# Patient Record
Sex: MALE | Race: ASIAN | NOT HISPANIC OR LATINO
[De-identification: names, ages, dates, MRNs, and addresses within clinical notes are randomized per-mention and may not be internally consistent; named-entity substitution may affect disease eponyms.]

---

## 2018-10-14 ENCOUNTER — TRANSCRIPTION ENCOUNTER (OUTPATIENT)
Age: 43
End: 2018-10-14

## 2018-10-15 ENCOUNTER — OUTPATIENT (OUTPATIENT)
Dept: OUTPATIENT SERVICES | Facility: HOSPITAL | Age: 43
LOS: 1 days | Discharge: HOME | End: 2018-10-15

## 2018-10-15 VITALS
TEMPERATURE: 98 F | SYSTOLIC BLOOD PRESSURE: 101 MMHG | DIASTOLIC BLOOD PRESSURE: 69 MMHG | OXYGEN SATURATION: 98 % | HEART RATE: 62 BPM

## 2018-10-15 VITALS
WEIGHT: 171.08 LBS | HEIGHT: 66 IN | RESPIRATION RATE: 16 BRPM | OXYGEN SATURATION: 98 % | DIASTOLIC BLOOD PRESSURE: 80 MMHG | TEMPERATURE: 98 F | HEART RATE: 70 BPM | SYSTOLIC BLOOD PRESSURE: 112 MMHG

## 2018-10-15 DIAGNOSIS — M75.122 COMPLETE ROTATOR CUFF TEAR OR RUPTURE OF LEFT SHOULDER, NOT SPECIFIED AS TRAUMATIC: ICD-10-CM

## 2018-10-15 DIAGNOSIS — M75.100 UNSPECIFIED ROTATOR CUFF TEAR OR RUPTURE OF UNSPECIFIED SHOULDER, NOT SPECIFIED AS TRAUMATIC: ICD-10-CM

## 2018-10-15 DIAGNOSIS — Z98.890 OTHER SPECIFIED POSTPROCEDURAL STATES: Chronic | ICD-10-CM

## 2018-10-15 LAB
HCT VFR BLD CALC: 43.3 % — SIGNIFICANT CHANGE UP (ref 39–50)
HGB BLD-MCNC: 14.2 G/DL — SIGNIFICANT CHANGE UP (ref 13–17)
MCHC RBC-ENTMCNC: 29 PG — SIGNIFICANT CHANGE UP (ref 27–34)
MCHC RBC-ENTMCNC: 32.8 % — SIGNIFICANT CHANGE UP (ref 32–36)
MCV RBC AUTO: 88.5 FL — SIGNIFICANT CHANGE UP (ref 80–100)
NRBC # FLD: 0 — SIGNIFICANT CHANGE UP
PLATELET # BLD AUTO: 350 K/UL — SIGNIFICANT CHANGE UP (ref 150–400)
PMV BLD: 10.1 FL — SIGNIFICANT CHANGE UP (ref 7–13)
RBC # BLD: 4.89 M/UL — SIGNIFICANT CHANGE UP (ref 4.2–5.8)
RBC # FLD: 14.4 % — SIGNIFICANT CHANGE UP (ref 10.3–14.5)
WBC # BLD: 9.34 K/UL — SIGNIFICANT CHANGE UP (ref 3.8–10.5)
WBC # FLD AUTO: 9.34 K/UL — SIGNIFICANT CHANGE UP (ref 3.8–10.5)

## 2018-10-15 RX ORDER — ONDANSETRON 8 MG/1
1 TABLET, FILM COATED ORAL
Qty: 12 | Refills: 0 | OUTPATIENT
Start: 2018-10-15 | End: 2018-10-17

## 2018-10-15 NOTE — ASU DISCHARGE PLAN (ADULT/PEDIATRIC). - NOTIFY
Inability to Tolerate Liquids or Foods/Swelling that continues/Pain not relieved by Medications/Fever greater than 101/Bleeding that does not stop/Persistent Nausea and Vomiting/Unable to Urinate

## 2018-10-15 NOTE — H&P PST ADULT - NSANTHOSAYNRD_GEN_A_CORE
No. DAPHNEY screening performed.  STOP BANG Legend: 0-2 = LOW Risk; 3-4 = INTERMEDIATE Risk; 5-8 = HIGH Risk

## 2018-10-15 NOTE — ASU DISCHARGE PLAN (ADULT/PEDIATRIC). - NURSING INSTRUCTIONS
MUST wear sling @ ALL TIMES even when sleeping; may remove it for showering  DO NOT left lift arm; keep left arm @ side or in sling  Protect extremity until anesthesia block wears off.   DO NOT take any Tylenol (Acetaminophen) or narcotics containing Tylenol until after  _9pm tonight_____ . You received Tylenol during your operation and it can cause damage to your liver if too much is taken within a 24 hour time period.   apply ice 20minutes ON 20minutes OFF

## 2018-10-15 NOTE — H&P PST ADULT - FAMILY HISTORY
Father  Still living? Unknown  Hypertension, Age at diagnosis: Age Unknown  Diabetes mellitus, Age at diagnosis: Age Unknown     Mother  Still living? Unknown  Hypertension, Age at diagnosis: Age Unknown  Diabetes mellitus, Age at diagnosis: Age Unknown

## 2018-10-15 NOTE — H&P PST ADULT - HISTORY OF PRESENT ILLNESS
43 year old male with c/o left shoulder pain worsening over the past 3 months. Pt had MRI which revealed rotator cuff tear. Pt present today for presurgical evaluaiton for ... 43 year old male with c/o left shoulder pain worsening over the past 3 months. Pt had MRI which revealed rotator cuff tear. Pt present today for presurgical evaluation for Left Shoulder Arthroscopy, Rotator Cuff Repair scheduled today. Pt states he last ate at 10 pm yesterday and drank water this morning at 7:30 AM.

## 2018-10-15 NOTE — H&P PST ADULT - RESPIRATORY
Patient was called back and she states that she was seen by Dr Mca yesterday and was instructed to discontinue Xarelto and start aspirin 325 mg daily.  Patient would like to schedule appointment to have baker's cyst drained.  Order has been placed per Kingsley Stern PA-C.  Patient understands that she will get a call from scheduling.   detailed exam

## 2018-10-15 NOTE — ASU DISCHARGE PLAN (ADULT/PEDIATRIC). - MEDICATION SUMMARY - MEDICATIONS TO TAKE
I will START or STAY ON the medications listed below when I get home from the hospital:    Percocet 5/325 oral tablet  -- 1-2 tab(s) by mouth every 6 hours, As Needed -for moderate pain MDD:8 tabs  -- Caution federal law prohibits the transfer of this drug to any person other  than the person for whom it was prescribed.  May cause drowsiness.  Alcohol may intensify this effect.  Use care when operating dangerous machinery.  This prescription cannot be refilled.  This product contains acetaminophen.  Do not use  with any other product containing acetaminophen to prevent possible liver damage.  Using more of this medication than prescribed may cause serious breathing problems.    -- Indication: For pain    Zofran 4 mg oral tablet  -- 1 tab(s) by mouth every 6 hours, As Needed for nausea  -- Indication: For nausea

## 2020-01-27 ENCOUNTER — OUTPATIENT (OUTPATIENT)
Dept: OUTPATIENT SERVICES | Facility: HOSPITAL | Age: 45
LOS: 1 days | End: 2020-01-27

## 2020-01-27 VITALS
DIASTOLIC BLOOD PRESSURE: 82 MMHG | SYSTOLIC BLOOD PRESSURE: 138 MMHG | TEMPERATURE: 98 F | WEIGHT: 177.91 LBS | RESPIRATION RATE: 14 BRPM | HEART RATE: 88 BPM | HEIGHT: 66.25 IN | OXYGEN SATURATION: 96 %

## 2020-01-27 DIAGNOSIS — M75.121 COMPLETE ROTATOR CUFF TEAR OR RUPTURE OF RIGHT SHOULDER, NOT SPECIFIED AS TRAUMATIC: ICD-10-CM

## 2020-01-27 DIAGNOSIS — Z98.890 OTHER SPECIFIED POSTPROCEDURAL STATES: Chronic | ICD-10-CM

## 2020-01-27 LAB
ANION GAP SERPL CALC-SCNC: 15 MMO/L — HIGH (ref 7–14)
BUN SERPL-MCNC: 12 MG/DL — SIGNIFICANT CHANGE UP (ref 7–23)
CALCIUM SERPL-MCNC: 9.5 MG/DL — SIGNIFICANT CHANGE UP (ref 8.4–10.5)
CHLORIDE SERPL-SCNC: 95 MMOL/L — LOW (ref 98–107)
CO2 SERPL-SCNC: 27 MMOL/L — SIGNIFICANT CHANGE UP (ref 22–31)
CREAT SERPL-MCNC: 0.87 MG/DL — SIGNIFICANT CHANGE UP (ref 0.5–1.3)
GLUCOSE SERPL-MCNC: 79 MG/DL — SIGNIFICANT CHANGE UP (ref 70–99)
HCT VFR BLD CALC: 47.9 % — SIGNIFICANT CHANGE UP (ref 39–50)
HGB BLD-MCNC: 15.7 G/DL — SIGNIFICANT CHANGE UP (ref 13–17)
MCHC RBC-ENTMCNC: 29.5 PG — SIGNIFICANT CHANGE UP (ref 27–34)
MCHC RBC-ENTMCNC: 32.8 % — SIGNIFICANT CHANGE UP (ref 32–36)
MCV RBC AUTO: 90 FL — SIGNIFICANT CHANGE UP (ref 80–100)
NRBC # FLD: 0 K/UL — SIGNIFICANT CHANGE UP (ref 0–0)
PLATELET # BLD AUTO: 367 K/UL — SIGNIFICANT CHANGE UP (ref 150–400)
PMV BLD: 10.4 FL — SIGNIFICANT CHANGE UP (ref 7–13)
POTASSIUM SERPL-MCNC: 4 MMOL/L — SIGNIFICANT CHANGE UP (ref 3.5–5.3)
POTASSIUM SERPL-SCNC: 4 MMOL/L — SIGNIFICANT CHANGE UP (ref 3.5–5.3)
RBC # BLD: 5.32 M/UL — SIGNIFICANT CHANGE UP (ref 4.2–5.8)
RBC # FLD: 14 % — SIGNIFICANT CHANGE UP (ref 10.3–14.5)
SODIUM SERPL-SCNC: 137 MMOL/L — SIGNIFICANT CHANGE UP (ref 135–145)
WBC # BLD: 10.84 K/UL — HIGH (ref 3.8–10.5)
WBC # FLD AUTO: 10.84 K/UL — HIGH (ref 3.8–10.5)

## 2020-01-27 RX ORDER — IBUPROFEN 200 MG
1 TABLET ORAL
Qty: 0 | Refills: 0 | DISCHARGE

## 2020-01-27 NOTE — H&P PST ADULT - ASSESSMENT
DX: complete rotator cuff tear or rupture of right shoulder, not specified as traumatic and evaluated for a scheduled right shoulder arthroscopy rotator cuff repair on 2/3/2020.    Plan: DX: complete rotator cuff tear or rupture of right shoulder, not specified as traumatic and evaluated for a scheduled right shoulder arthroscopy rotator cuff repair on 2/3/2020.    Plan:   Preop instructions provided including NPO status, Pepcid and Hibiclens Verbalized understanding. BW sent and awaiting results. MC pending requested due to severe sinus tenderness/ congestion noted and pt stated S&S to be present for  1 wk. Form provided, PST to f/u. DX: complete rotator cuff tear or rupture of right shoulder, not specified as traumatic and evaluated for a scheduled right shoulder arthroscopy rotator cuff repair on 2/3/2020.    Plan:   Preop instructions provided including NPO status, Pepcid and Hibiclens wash. Pt verbalized understanding. BW sent and awaiting results. MC pending requested due to severe sinus tenderness/ congestion noted and pt stated S&S to be present for  1 wk. Form provided, PST to f/u. Pt meets DAPHNEY precautions criteria OR booking notified via fax.

## 2020-01-27 NOTE — H&P PST ADULT - NSANTHOSAYNRD_GEN_A_CORE
No. DAPHNEY screening performed.  STOP BANG Legend: 0-2 = LOW Risk; 3-4 = INTERMEDIATE Risk; 5-8 = HIGH Risk/never tested

## 2020-01-27 NOTE — H&P PST ADULT - MUSCULOSKELETAL
details… detailed exam decreased ROM due to pain/RUE/no calf tenderness/joint swelling/diminished strength

## 2020-01-27 NOTE — H&P PST ADULT - HISTORY OF PRESENT ILLNESS
43 y/o male presents to PST w/ a preop dx of complete rotator cuff tear or rupture of right shoulder, not specified as traumatic and to be evaluated for a scheduled right shoulder arthroscopy rotator cuff repair on 2/3/2020. Pt states was pushing heavy boxes last december 2019 and injured rt shoulder. Pt went to see his pcp and MRI ordered, pt dx w/ a rotator cuff tear and referred to Dr Pride who evaluated him (did sx on left shoulder on 2018) and recommended sx at this time.

## 2020-01-27 NOTE — H&P PST ADULT - NEGATIVE ALLERGY TYPES
no reactions to insect bites/no indoor environmental allergies/no reactions to food/no reactions to animals/no outdoor environmental allergies

## 2020-01-27 NOTE — H&P PST ADULT - ENT GEN HX ROS MEA POS PC
nasal congestion/post-nasal discharge/cold resolving w/meds otc/sinus symptoms/nasal discharge/nasal obstruction nasal discharge/post-nasal discharge/sinus symptoms/nasal congestion/cold / sinus symptoms x 1 wk stated, on meds otc w/o relief/nasal obstruction

## 2020-01-27 NOTE — H&P PST ADULT - NSICDXPASTMEDICALHX_GEN_ALL_CORE_FT
PAST MEDICAL HISTORY:  Asthma denies intubations or hospitalizations    No pertinent past medical history

## 2020-01-27 NOTE — H&P PST ADULT - NEGATIVE CARDIOVASCULAR SYMPTOMS
no orthopnea/no chest pain/no palpitations/no paroxysmal nocturnal dyspnea/no claudication/no dyspnea on exertion/no peripheral edema

## 2020-01-27 NOTE — H&P PST ADULT - NEGATIVE ENMT SYMPTOMS
no recurrent cold sores/no hearing difficulty/no abnormal taste sensation/no dry mouth/no ear pain/no tinnitus/no nose bleeds/no vertigo/no gum bleeding/no throat pain/no dysphagia

## 2020-01-27 NOTE — H&P PST ADULT - RS GEN PE MLT RESP DETAILS PC
no chest wall tenderness/respirations non-labored/good air movement/breath sounds equal/no subcutaneous emphysema/clear to auscultation bilaterally/no rhonchi/no wheezes/no rales

## 2020-01-27 NOTE — H&P PST ADULT - NSICDXFAMILYHX_GEN_ALL_CORE_FT
FAMILY HISTORY:  Father  Still living? Unknown  Diabetes mellitus, Age at diagnosis: Age Unknown  Hypertension, Age at diagnosis: Age Unknown    Mother  Still living? Unknown  Diabetes mellitus, Age at diagnosis: Age Unknown  Hypertension, Age at diagnosis: Age Unknown

## 2020-01-27 NOTE — H&P PST ADULT - NS SC CAGE ALCOHOL CUT DOWN
Detail Level: Detailed Add 64746 Cpt? (Important Note: In 2017 The Use Of 45772 Is Being Tracked By Cms To Determine Future Global Period Reimbursement For Global Periods): no no

## 2020-02-02 ENCOUNTER — TRANSCRIPTION ENCOUNTER (OUTPATIENT)
Age: 45
End: 2020-02-02

## 2020-02-03 ENCOUNTER — OUTPATIENT (OUTPATIENT)
Dept: OUTPATIENT SERVICES | Facility: HOSPITAL | Age: 45
LOS: 1 days | Discharge: HOME | End: 2020-02-03

## 2020-02-03 VITALS
OXYGEN SATURATION: 99 % | HEART RATE: 76 BPM | SYSTOLIC BLOOD PRESSURE: 121 MMHG | DIASTOLIC BLOOD PRESSURE: 67 MMHG | TEMPERATURE: 98 F | RESPIRATION RATE: 18 BRPM

## 2020-02-03 VITALS
TEMPERATURE: 98 F | SYSTOLIC BLOOD PRESSURE: 134 MMHG | RESPIRATION RATE: 18 BRPM | HEIGHT: 66.25 IN | OXYGEN SATURATION: 99 % | DIASTOLIC BLOOD PRESSURE: 92 MMHG | WEIGHT: 177.91 LBS | HEART RATE: 78 BPM

## 2020-02-03 DIAGNOSIS — Z98.890 OTHER SPECIFIED POSTPROCEDURAL STATES: Chronic | ICD-10-CM

## 2020-02-03 DIAGNOSIS — M75.121 COMPLETE ROTATOR CUFF TEAR OR RUPTURE OF RIGHT SHOULDER, NOT SPECIFIED AS TRAUMATIC: ICD-10-CM

## 2020-02-03 RX ORDER — ONDANSETRON 8 MG/1
1 TABLET, FILM COATED ORAL
Qty: 12 | Refills: 0
Start: 2020-02-03 | End: 2020-02-05

## 2020-02-03 NOTE — ASU DISCHARGE PLAN (ADULT/PEDIATRIC) - CALL YOUR DOCTOR IF YOU HAVE ANY OF THE FOLLOWING:
Numbness, tingling, color or temperature change to extremity/Wound/Surgical Site with redness, or foul smelling discharge or pus/Bleeding that does not stop/Fever greater than (need to indicate Fahrenheit or Celsius)/Swelling that gets worse/Pain not relieved by Medications Wound/Surgical Site with redness, or foul smelling discharge or pus/Pain not relieved by Medications/Bleeding that does not stop/Fever greater than (need to indicate Fahrenheit or Celsius)/Nausea and vomiting that does not stop/Numbness, tingling, color or temperature change to extremity/Unable to urinate/Inability to tolerate liquids or foods/Swelling that gets worse

## 2020-02-03 NOTE — ASU DISCHARGE PLAN (ADULT/PEDIATRIC) - CARE PROVIDER_API CALL
Jesús Pride)  Orthopaedic Surgery; Surgery of the Hand  [address], [address]  [place] [address]  Phone: [phone]  Fax: [phone]  Follow Up Time:

## 2020-02-03 NOTE — ASU DISCHARGE PLAN (ADULT/PEDIATRIC) - PAIN MANAGEMENT
Prescriptions electronically submitted to pharmacy from [facility] Prescriptions electronically submitted to pharmacy from [facility]/Do not mix Percocet with Tylenol (acetaminophen) as it already contains Tylenol.

## 2020-05-05 NOTE — H&P PST ADULT - GENITOURINARY
Anesthesia Post Evaluation    Patient: Daphnie Vazquez    Procedure(s) Performed: Procedure(s) (LRB):  Ablation, SVT, Accessory Pathway (N/A)    Final Anesthesia Type: general    Patient location during evaluation: PACU  Patient participation: Yes- Able to Participate  Level of consciousness: awake and alert and oriented  Post-procedure vital signs: reviewed and stable  Pain management: adequate  Airway patency: patent    PONV status at discharge: No PONV  Anesthetic complications: no      Cardiovascular status: blood pressure returned to baseline, hemodynamically stable and stable  Respiratory status: unassisted, room air and spontaneous ventilation  Hydration status: euvolemic  Follow-up not needed.          Vitals Value Taken Time   /80 5/5/2020 10:32 AM   Temp 36.7 °C (98 °F) 5/5/2020 10:45 AM   Pulse 53 5/5/2020 11:00 AM   Resp 16 5/5/2020 11:00 AM   SpO2 100 % 5/5/2020 11:00 AM         No case tracking events are documented in the log.      Pain/Magdalena Score: Magdalena Score: 10 (5/5/2020 11:00 AM)        
details…

## 2020-09-23 NOTE — ASU PREOPERATIVE ASSESSMENT, ADULT ([facility] ONLY) - FALLEN IN THE PAST
Medicare Wellness Visit  Plan for Preventive Care    A good way for you to stay healthy is to use preventive care.  Medicare covers many services that can help you stay healthy.* The goal of these services is to find any health problems as quickly as possible. Finding problems early can help make them easier to treat.  Your personal plan below lists the services you may need and when they are due.     Health Maintenance Summary     Shingles Vaccine (1 of 2)  Overdue since 4/8/2006    Influenza Vaccine (1)  Overdue since 9/1/2020    Breast Cancer Screening (Every 2 Years)  Ordered on 6/23/2020    DM/CKD GFR (Yearly)  Order placed this encounter    DM/CKD Microalbumin (Yearly)  Next due on 6/23/2021    Medicare Wellness Visit (Yearly)  Next due on 9/23/2021    Colorectal Cancer Screening-Colonoscopy (Every 10 Years)  Next due on 1/6/2024    DTaP/Tdap/Td Vaccine (2 - Td)  Next due on 6/23/2026    Pneumococcal Vaccine 0-64   Aged Out    Hepatitis C Screening   Completed    Hepatitis B Vaccine   Aged Out    Meningococcal Vaccine   Aged Out    HPV Vaccine   Aged Out           Preventive Care for Women and Men    Heart Screenings (Cardiovascular):  · Blood tests are used to check your cholesterol, lipid and triglyceride levels. High levels can increase your risk for heart disease and stroke. High levels can be treated with medications, diet and exercise. Lowering your levels can help keep your heart and blood vessels healthy.  Your provider will order these tests if they are needed.    · An ultrasound is done to see if you have an abdominal aortic aneurysm (AAA).  This is an enlargement of one of the main blood vessels that delivers blood to the body.   In the [place], 9,000 deaths are caused by AAA.  You may not even know you have this problem and as many as 1 in 3 people will have a serious problem if it is not treated.  Early diagnosis allows for more effective treatment and cure.  If you have a family history  of AAA or are a male age 65-75 who has smoked, you are at higher risk of an AAA.  Your provider can order this test, if needed.    Colorectal Screening:  · There are many tests that are used to check for cancer of your colon and rectum. You and your provider should discuss what test is best for you and when to have it done.  Options include:  · Screening Colonoscopy: exam of the entire colon, seen through a flexible lighted tube.  · Flexible Sigmoidoscopy: exam of the last third (sigmoid portion) of the colon and rectum, seen through a flexible lighted tube.  · Cologuard DNA stool test: a sample of your stool is used to screen for cancer and unseen blood in your stool.  · Fecal Occult Blood Test: a sample of your stool is studied to find any unseen blood    Flu Shot:  · An immunization that helps to prevent influenza (the flu). You should get this every year. The best time to get the shot is in the fall.    Pneumococcal Shot:  • Vaccines are available that can help prevent pneumococcal disease, which is any type of infection caused by Streptococcus pneumoniae bacteria.   Their use can prevent some cases of pneumonia, meningitis, and sepsis. There are two types of pneumococcal vaccines:   o Conjugate vaccines (PCV-13 or Prevnar 13®) - helps protect against the 13 types of pneumococcal bacteria that are the most common causes of serious infections in children and adults.    o Polysaccharide vaccine (PPSV23 or Uzprrctrh96®) - helps protect against 23 types of pneumococcal bacteria for patients who are recommended to get it.  These vaccines should be given at least 12 months apart.  A booster is usually not needed.     Hepatitis B Shot:  · An immunization that helps to protect people from getting Hepatitis B. Hepatitis B is a virus that spreads through contact with infected blood or body fluids. Many people with the virus do not have symptoms.  The virus can lead to serious problems, such as liver disease. Some people  are at higher risk than others. Your doctor will tell you if you need this shot.     Diabetes Screening:  · A test to measure sugar (glucose) in your blood is called a fasting blood sugar. Fasting means you cannot have food or drink for at least 8 hours before the test. This test can detect diabetes long before you may notice symptoms.    Glaucoma Screening:  · Glaucoma screening is performed by your eye doctor. The test measures the fluid pressure inside your eyes to determine if you have glaucoma.     Hepatitis C Screening:  · A blood test to see if you have the hepatitis C virus.  Hepatitis C attacks the liver and is a major cause of chronic liver disease.  Medicare will cover a single screening for all adults born between 1945 & 1965, or high risk patients (people who have injected illegal drugs or people who have had blood transfusions).  High risk patients who continue to inject illegal drugs can be screened for Hepatitis C every year.    Smoking and Tobacco-Use Cessation Counseling:  · Tobacco is the single greatest cause of disease and early death in our country today. Medication and counseling together can increase a person’s chance of quitting for good.   · Medicare covers two quitting attempts per year, with four counseling sessions per attempt (eight sessions in a 12 month period)    Preventive Screening tests for Women    Screening Mammograms and Breast Exams:  · An x-ray of your breasts to check for breast cancer before you or your doctor may be able to feel it.  If breast cancer is found early it can usually be treated with success.    Pelvic Exams and Pap Tests:  · An exam to check for cervical and vaginal cancer. A Pap test is a lab test in which cells are taken from your cervix and sent to the lab to look for signs of cervical cancer. If cancer of the cervix is found early, chances for a cure are good. Testing can generally end at age 65, or if a woman has a hysterectomy for a benign condition.  Your provider may recommend more frequent testing if certain abnormal results are found.    Bone Mass Measurements:  · A painless x-ray of your bone density to see if you are at risk for a broken bone. Bone density refers to the thickness of bones or how tightly the bone tissue is packed.    Preventive Screening tests for Men    Prostate Screening:  · Should you have a prostate cancer test (PSA)?  It is up to you to decide if you want a prostate cancer test. Talk to your clinician to find out if the test is right for you.  Things for you to consider and talk about should include:  · Benefits and harms of the test  · Your family history  · How your race/ethnicity may influence the test  · If the test may impact other medical conditions you have  · Your values on screenings and treatments    *Medicare pays for many preventive services to keep you healthy. For some of these services, you might have to pay a deductible, coinsurance, and / or copayment.  The amounts vary depending on the type of services you need and the kind of Medicare health plan you have.               no

## 2020-10-29 NOTE — ASU PREOP CHECKLIST - SITE MARKED BY SURGEON
yes LEFT/yes Xeljanicez Pregnancy And Lactation Text: This medication is Pregnancy Category D and is not considered safe during pregnancy.  The risk during breast feeding is also uncertain.

## 2020-11-29 NOTE — H&P PST ADULT - BACK EXAM
[facility]  Anesthesia Pre-Operative Evaluation         Patient Name: Doris Thompson  YOB: 2009  MRN: [id number]    SUBJECTIVE:     Pre-operative evaluation for Procedure(s) (LRB):  CHOLECYSTECTOMY, LAPAROSCOPIC (N/A)     11/29/2020    Doris Thompson is a 11 y.o. female w/ a significant PMHx of gallstones currently with acute biliary pancreatitis.    Patient now presents for the above procedure(s).      LDA:        Peripheral IV - Single Lumen 11/28/20 0405 20 G Right Antecubital (Active)   Site Assessment Clean;Dry;Intact;No redness;No swelling 11/29/20 0611   Line Status Infusing 11/29/20 0611   Dressing Status Clean;Dry;Intact 11/29/20 0611   Dressing Intervention Integrity maintained 11/29/20 0611   Reason Not Rotated Not due 11/28/20 1920   Number of days: 1        Prev airway: None documented.    Drips: None documented.   dextrose 5 % and 0.9 % NaCl with KCl 20 mEq 1,000 mL (11/29/20 0914)        Patient Active Problem List   Diagnosis    Acute biliary pancreatitis without infection or necrosis    Gallstones       Review of patient's allergies indicates:  No Known Allergies    Current Outpatient Medications:    Current Facility-Administered Medications:     acetaminophen tablet 500 mg, 500 mg, Oral, Q6H PRN, Sonya Adame MD, 500 mg at 11/29/20 0913    dextrose 5 % and 0.9 % NaCl with KCl 20 mEq infusion, 1,000 mL, Intravenous, Continuous, Bernadine Sumner MD, Last Rate: 120 mL/hr at 11/29/20 0914, 1,000 mL at 11/29/20 0914    ketorolac injection 15 mg, 15 mg, Intravenous, Q6H PRN, Gumaro Law MD    morphine injection 2 mg, 2 mg, Intravenous, Q4H PRN, Bernadine Sumner MD    ondansetron injection 4 mg, 4 mg, Intravenous, Q6H PRN, Bernadine Sumner MD    No past surgical history on file.    Social History     Socioeconomic History    Marital status: Single     Spouse name: Not on file    Number of children: Not on file    Years of education: Not  on file    Highest education level: Not on file   Occupational History    Not on file   Social Needs    Financial resource strain: Not on file    Food insecurity     Worry: Not on file     Inability: Not on file    Transportation needs     Medical: Not on file     Non-medical: Not on file   Tobacco Use    Smoking status: Not on file   Substance and Sexual Activity    Alcohol use: Not on file    Drug use: Not on file    Sexual activity: Not on file   Lifestyle    Physical activity     Days per week: Not on file     Minutes per session: Not on file    Stress: Not on file   Relationships    Social connections     Talks on phone: Not on file     Gets together: Not on file     Attends Hinduism service: Not on file     Active member of club or organization: Not on file     Attends meetings of clubs or organizations: Not on file     Relationship status: Not on file   Other Topics Concern    Not on file   Social History Narrative    Not on file       OBJECTIVE:     Vital Signs Range (Last 24H):  Temp:  [37.1 °C (98.8 °F)-38.2 °C (100.8 °F)]   Pulse:  []   Resp:  [18-32]   BP: (102-113)/(51-59)   SpO2:  [92 %-100 %]       Significant Labs:  Lab Results   Component Value Date    WBC 15.58 (H) 11/29/2020    HGB 9.2 (L) 11/29/2020    HCT 29.5 (L) 11/29/2020     11/29/2020    CHOL 158 11/28/2020    TRIG 91 11/28/2020    HDL 42 11/28/2020    ALT 50 (H) 11/29/2020    AST 16 11/29/2020     11/29/2020    K 4.2 11/29/2020     11/29/2020    CREATININE 0.5 11/29/2020    BUN 7 11/29/2020    CO2 22 (L) 11/29/2020       Diagnostic Studies: No relevant studies.    EKG:   No results found for this or any previous visit.    2D ECHO:  TTE:  No results found for this or any previous visit.    RAYRAY:  No results found for this or any previous visit.    ASSESSMENT/PLAN:                                                                                                       11/29/2020  Doris Thompson is a 11  y.o., female.    Anesthesia Evaluation    I have reviewed the Patient Summary Reports.    I have reviewed the Nursing Notes. I have reviewed the NPO Status.   I have reviewed the Medications.     Review of Systems  Anesthesia Hx:  Neg history of prior surgery. Denies Family Hx of Anesthesia complications.    Hematology/Oncology:  Hematology Normal   Oncology Normal     EENT/Dental:EENT/Dental Normal   Cardiovascular:  Cardiovascular Normal     Pulmonary:  Pulmonary Normal    Hepatic/GI:  Hepatic/GI Normal    Musculoskeletal:  Musculoskeletal Normal    Neurological:  Neurology Normal    Endocrine:  Endocrine Normal    Dermatological:  Skin Normal    Psych:  Psychiatric Normal           Physical Exam  General:  Well nourished    Airway/Jaw/Neck:  Airway Findings: Mouth Opening: Normal Tongue: Normal  General Airway Assessment: Pediatric  Mallampati: II  TM Distance: 4 - 6 cm  Jaw/Neck Findings:  Neck ROM: Normal ROM      Dental:  Dental Findings: In tact   Chest/Lungs:  Chest/Lungs Findings: Clear to auscultation, Normal Respiratory Rate     Heart/Vascular:  Heart Findings: Rate: Normal  Rhythm: Regular Rhythm  Sounds: Normal     Abdomen:  Abdomen Findings: Normal    Musculoskeletal:  Musculoskeletal Findings: Normal   Skin:  Skin Findings: Normal    Mental Status:  Mental Status Findings:  Cooperative, Alert and Oriented         Anesthesia Plan  Type of Anesthesia, risks & benefits discussed:  Anesthesia Type:  general  Patient's Preference:   Intra-op Monitoring Plan: standard ASA monitors  Intra-op Monitoring Plan Comments:   Post Op Pain Control Plan: multimodal analgesia, IV/PO Opioids PRN and per primary service following discharge from PACU  Post Op Pain Control Plan Comments:   Induction:   IV  Beta Blocker:  Patient is not currently on a Beta-Blocker (No further documentation required).       Informed Consent: Patient understands risks and agrees with Anesthesia plan.  Questions answered. Anesthesia consent  signed with patient.  ASA Score: 1     Day of Surgery Review of History & Physical: I have interviewed and examined the patient. I have reviewed the patient's H&P dated:  There are no significant changes.  H&P update referred to the surgeon.         Ready For Surgery From Anesthesia Perspective.        normal shape/strength intact/ROM intact

## 2021-08-28 ENCOUNTER — EMERGENCY (EMERGENCY)
Facility: HOSPITAL | Age: 46
LOS: 0 days | Discharge: HOME | End: 2021-08-29
Attending: STUDENT IN AN ORGANIZED HEALTH CARE EDUCATION/TRAINING PROGRAM
Payer: COMMERCIAL

## 2021-08-28 VITALS
SYSTOLIC BLOOD PRESSURE: 117 MMHG | RESPIRATION RATE: 18 BRPM | DIASTOLIC BLOOD PRESSURE: 75 MMHG | OXYGEN SATURATION: 98 % | HEIGHT: 67 IN | HEART RATE: 60 BPM | TEMPERATURE: 98 F | WEIGHT: 175.05 LBS

## 2021-08-28 DIAGNOSIS — J45.909 UNSPECIFIED ASTHMA, UNCOMPLICATED: ICD-10-CM

## 2021-08-28 DIAGNOSIS — R07.9 CHEST PAIN, UNSPECIFIED: ICD-10-CM

## 2021-08-28 DIAGNOSIS — R20.2 PARESTHESIA OF SKIN: ICD-10-CM

## 2021-08-28 DIAGNOSIS — Z98.890 OTHER SPECIFIED POSTPROCEDURAL STATES: Chronic | ICD-10-CM

## 2021-08-28 DIAGNOSIS — Z20.822 CONTACT WITH AND (SUSPECTED) EXPOSURE TO COVID-19: ICD-10-CM

## 2021-08-28 DIAGNOSIS — Z88.1 ALLERGY STATUS TO OTHER ANTIBIOTIC AGENTS STATUS: ICD-10-CM

## 2021-08-28 DIAGNOSIS — F17.210 NICOTINE DEPENDENCE, CIGARETTES, UNCOMPLICATED: ICD-10-CM

## 2021-08-28 LAB
ALBUMIN SERPL ELPH-MCNC: 3.4 G/DL — SIGNIFICANT CHANGE UP (ref 3.3–5)
ALP SERPL-CCNC: 64 U/L — SIGNIFICANT CHANGE UP (ref 40–120)
ALT FLD-CCNC: 43 U/L — SIGNIFICANT CHANGE UP (ref 12–78)
ANION GAP SERPL CALC-SCNC: 3 MMOL/L — LOW (ref 5–17)
APTT BLD: 33 SEC — SIGNIFICANT CHANGE UP (ref 27.5–35.5)
AST SERPL-CCNC: 18 U/L — SIGNIFICANT CHANGE UP (ref 15–37)
BASOPHILS # BLD AUTO: 0.04 K/UL — SIGNIFICANT CHANGE UP (ref 0–0.2)
BASOPHILS NFR BLD AUTO: 0.5 % — SIGNIFICANT CHANGE UP (ref 0–2)
BILIRUB SERPL-MCNC: 0.3 MG/DL — SIGNIFICANT CHANGE UP (ref 0.2–1.2)
BUN SERPL-MCNC: 16 MG/DL — SIGNIFICANT CHANGE UP (ref 7–23)
CALCIUM SERPL-MCNC: 8.2 MG/DL — LOW (ref 8.5–10.1)
CHLORIDE SERPL-SCNC: 106 MMOL/L — SIGNIFICANT CHANGE UP (ref 96–108)
CK MB BLD-MCNC: 0.6 % — SIGNIFICANT CHANGE UP (ref 0–3.5)
CK MB BLD-MCNC: 0.7 % — SIGNIFICANT CHANGE UP (ref 0–3.5)
CK MB CFR SERPL CALC: 1.8 NG/ML — SIGNIFICANT CHANGE UP (ref 0.5–3.6)
CK MB CFR SERPL CALC: 2 NG/ML — SIGNIFICANT CHANGE UP (ref 0.5–3.6)
CK SERPL-CCNC: 270 U/L — SIGNIFICANT CHANGE UP (ref 26–308)
CK SERPL-CCNC: 324 U/L — HIGH (ref 26–308)
CO2 SERPL-SCNC: 31 MMOL/L — SIGNIFICANT CHANGE UP (ref 22–31)
CREAT SERPL-MCNC: 0.89 MG/DL — SIGNIFICANT CHANGE UP (ref 0.5–1.3)
EOSINOPHIL # BLD AUTO: 0.34 K/UL — SIGNIFICANT CHANGE UP (ref 0–0.5)
EOSINOPHIL NFR BLD AUTO: 3.9 % — SIGNIFICANT CHANGE UP (ref 0–6)
FLUAV AG NPH QL: SIGNIFICANT CHANGE UP
FLUBV AG NPH QL: SIGNIFICANT CHANGE UP
GLUCOSE SERPL-MCNC: 100 MG/DL — HIGH (ref 70–99)
HCT VFR BLD CALC: 45.3 % — SIGNIFICANT CHANGE UP (ref 39–50)
HGB BLD-MCNC: 14.9 G/DL — SIGNIFICANT CHANGE UP (ref 13–17)
IMM GRANULOCYTES NFR BLD AUTO: 0.1 % — SIGNIFICANT CHANGE UP (ref 0–1.5)
INR BLD: 0.98 RATIO — SIGNIFICANT CHANGE UP (ref 0.88–1.16)
LYMPHOCYTES # BLD AUTO: 2.7 K/UL — SIGNIFICANT CHANGE UP (ref 1–3.3)
LYMPHOCYTES # BLD AUTO: 31.3 % — SIGNIFICANT CHANGE UP (ref 13–44)
MCHC RBC-ENTMCNC: 29.7 PG — SIGNIFICANT CHANGE UP (ref 27–34)
MCHC RBC-ENTMCNC: 32.9 GM/DL — SIGNIFICANT CHANGE UP (ref 32–36)
MCV RBC AUTO: 90.2 FL — SIGNIFICANT CHANGE UP (ref 80–100)
MONOCYTES # BLD AUTO: 0.9 K/UL — SIGNIFICANT CHANGE UP (ref 0–0.9)
MONOCYTES NFR BLD AUTO: 10.4 % — SIGNIFICANT CHANGE UP (ref 2–14)
NEUTROPHILS # BLD AUTO: 4.64 K/UL — SIGNIFICANT CHANGE UP (ref 1.8–7.4)
NEUTROPHILS NFR BLD AUTO: 53.8 % — SIGNIFICANT CHANGE UP (ref 43–77)
NRBC # BLD: 0 /100 WBCS — SIGNIFICANT CHANGE UP (ref 0–0)
PLATELET # BLD AUTO: 318 K/UL — SIGNIFICANT CHANGE UP (ref 150–400)
POTASSIUM SERPL-MCNC: 3.8 MMOL/L — SIGNIFICANT CHANGE UP (ref 3.5–5.3)
POTASSIUM SERPL-SCNC: 3.8 MMOL/L — SIGNIFICANT CHANGE UP (ref 3.5–5.3)
PROT SERPL-MCNC: 7.4 GM/DL — SIGNIFICANT CHANGE UP (ref 6–8.3)
PROTHROM AB SERPL-ACNC: 11.4 SEC — SIGNIFICANT CHANGE UP (ref 10.6–13.6)
RBC # BLD: 5.02 M/UL — SIGNIFICANT CHANGE UP (ref 4.2–5.8)
RBC # FLD: 14.9 % — HIGH (ref 10.3–14.5)
SARS-COV-2 RNA SPEC QL NAA+PROBE: SIGNIFICANT CHANGE UP
SODIUM SERPL-SCNC: 140 MMOL/L — SIGNIFICANT CHANGE UP (ref 135–145)
TROPONIN I SERPL-MCNC: <.015 NG/ML — SIGNIFICANT CHANGE UP (ref 0.01–0.04)
TROPONIN I SERPL-MCNC: <.015 NG/ML — SIGNIFICANT CHANGE UP (ref 0.01–0.04)
WBC # BLD: 8.63 K/UL — SIGNIFICANT CHANGE UP (ref 3.8–10.5)
WBC # FLD AUTO: 8.63 K/UL — SIGNIFICANT CHANGE UP (ref 3.8–10.5)

## 2021-08-28 PROCEDURE — 93010 ELECTROCARDIOGRAM REPORT: CPT

## 2021-08-28 PROCEDURE — 71045 X-RAY EXAM CHEST 1 VIEW: CPT | Mod: 26

## 2021-08-28 PROCEDURE — 99219: CPT

## 2021-08-28 PROCEDURE — 99285 EMERGENCY DEPT VISIT HI MDM: CPT

## 2021-08-28 RX ORDER — ASPIRIN/CALCIUM CARB/MAGNESIUM 324 MG
81 TABLET ORAL DAILY
Refills: 0 | Status: DISCONTINUED | OUTPATIENT
Start: 2021-08-28 | End: 2021-08-29

## 2021-08-28 NOTE — ED ADULT TRIAGE NOTE - CHIEF COMPLAINT QUOTE
47 y/o male with no PMH. Presents to the ED with c/c of left chest pain that radiates down the left shoulder that came on today at 1100. pt denies any n/v/d/c

## 2021-08-28 NOTE — H&P ADULT - NSHPLABSRESULTS_GEN_ALL_CORE
T(C): 36.8 (08-28-21 @ 16:53), Max: 36.8 (08-28-21 @ 16:53)  HR: 60 (08-28-21 @ 16:53) (60 - 60)  BP: 117/75 (08-28-21 @ 16:53) (117/75 - 117/75)  RR: 18 (08-28-21 @ 16:53) (18 - 18)  SpO2: 98% (08-28-21 @ 16:53) (98% - 98%)                        14.9   8.63  )-----------( 318      ( 28 Aug 2021 17:34 )             45.3     08-28    140  |  106  |  16  ----------------------------<  100<H>  3.8   |  31  |  0.89    Ca    8.2<L>      28 Aug 2021 17:34    TPro  7.4  /  Alb  3.4  /  TBili  0.3  /  DBili  x   /  AST  18  /  ALT  43  /  AlkPhos  64  08-28    LIVER FUNCTIONS - ( 28 Aug 2021 17:34 )  Alb: 3.4 g/dL / Pro: 7.4 gm/dL / ALK PHOS: 64 U/L / ALT: 43 U/L / AST: 18 U/L / GGT: x           PT/INR - ( 28 Aug 2021 17:34 )   PT: 11.4 sec;   INR: 0.98 ratio         PTT - ( 28 Aug 2021 17:34 )  PTT:33.0 sec    NSR at 86 bpm, no acute ischemic changes    aspirin enteric coated 81 milliGRAM(s) Oral daily

## 2021-08-28 NOTE — H&P ADULT - NSHPPHYSICALEXAM_GEN_ALL_CORE
PHYSICAL EXAM:    Vital Signs Last 24 Hrs  T(C): 36.8 (28 Aug 2021 16:53), Max: 36.8 (28 Aug 2021 16:53)  T(F): 98.2 (28 Aug 2021 16:53), Max: 98.2 (28 Aug 2021 16:53)  HR: 60 (28 Aug 2021 16:53) (60 - 60)  BP: 117/75 (28 Aug 2021 16:53) (117/75 - 117/75)  BP(mean): --  RR: 18 (28 Aug 2021 16:53) (18 - 18)  SpO2: 98% (28 Aug 2021 16:53) (98% - 98%)    GENERAL: Pt lying in bed comfortably in NAD  HEENT:  Atraumatic, EOMI, PERRL  NECK: Supple  CHEST/LUNG: Clear to auscultation bilaterally  HEART: Regular rate and rhythm  ABDOMEN: Bowel sounds present; Soft, Nontender, Nondistended.    EXTREMITIES:  2+ Peripheral Pulses, brisk capillary refill. No edema  NEUROLOGICAL:  No deficits   MSK: FROM x 4 extremities   SKIN: No rashes or lesions

## 2021-08-28 NOTE — ED ADULT NURSE NOTE - ED STAT RN HANDOFF DETAILS 2
Report given to hold RN Amyl, pt resting in stretcher, axo4, 20g IV R AC saline locked, NSR on monitor. No outstanding labs or medications.

## 2021-08-28 NOTE — ED PROVIDER NOTE - CLINICAL SUMMARY MEDICAL DECISION MAKING FREE TEXT BOX
pt presented with chest pain radiating into his left arm, initial ekg and trop negative, pt admitted to the observation unit to rule out due to presenting symptoms

## 2021-08-28 NOTE — H&P ADULT - ASSESSMENT
47 y/o male w/ no sig PMHx, presents to the ED c/o chest pain. Pt being placed on observation for r/o ACS    1) Chest pain  - c/w tele monitoring  - trends trops  - daily ASA  - f/u lipid panel in am  -     47 y/o male w/ no sig PMHx, presents to the ED c/o chest pain. Pt being placed on observation for r/o ACS    1) Chest pain  - c/w tele monitoring  - trends trops  - daily ASA  - f/u lipid panel in am  - pt w/ low DANAE and HEART Score; will complete trop set and discharge w/ outpt Cardiology follow up if negative.

## 2021-08-28 NOTE — H&P ADULT - HISTORY OF PRESENT ILLNESS
47 y/o male w/ no sig PMHx, presents to the ED c/o chest pain. Pt reports around 10:30 this morning while driving he developed sudden onset of left sided chest pain radiating to left arm, hand tingling. Pt describes pain as pressure/tight, 5/10 at max, no associated symptoms, and it lasted a few hours thus presented to the ED. Pt reports pain resolved by the time he arrived in the ED. Pt reports he does smoke tobacco however not heavily, he also drink a shot or 2 of vodka nightly, denies drug use or family hx of CAD. 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  In ED vitals stable, EKG no acute ischemia, trop neg x 1.

## 2021-08-28 NOTE — H&P ADULT - NSICDXFAMILYHX_GEN_ALL_CORE_FT
FAMILY HISTORY:  Father  Still living? Unknown  Diabetes mellitus, Age at diagnosis: Age Unknown  Hypertension, Age at diagnosis: Age Unknown    Mother  Still living? Unknown  Diabetes mellitus, Age at diagnosis: Age Unknown  Hypertension, Age at diagnosis: Age Unknown    
You can access the FollowMyHealth Patient Portal offered by [facility] by registering at the following website: [email or web address]. By joining [employer]’s FollowMyHealth portal, you will also be able to view your health information using other applications (apps) compatible with our system.

## 2021-08-28 NOTE — ED PROVIDER NOTE - MDM ORDERS SUBMITTED SELECTION
PT is postictal from home were she had a witnessed seizure that was about 2-3 minutes in length. PT is confused and has HX of brain tumor surgery in OCT. of 2018. PT appears confortable and aware Labs/EKG

## 2021-08-28 NOTE — ED ADULT NURSE REASSESSMENT NOTE - NS ED NURSE REASSESS COMMENT FT1
Pt axo4, resting in stretcher, pt placed on monitor, NSR noted, denies chest pain at this time. 20g IV L AC saline locked.

## 2021-08-28 NOTE — ED PROVIDER NOTE - OBJECTIVE STATEMENT
46 year old male with no past medical history presents today c/o chest pain, pt states that he was driving between 10-11am when he reports feeling a pressure type pain rated 8/10 which radiated into his left shoulder and arm with numbness which was constant, now has resolved in the ER, pt denies sob, palpitations, calf pain, hormonal medications or recent travel

## 2021-08-28 NOTE — ED ADULT NURSE NOTE - OBJECTIVE STATEMENT
pt is a 46 year old male, AAX4, presents with chest tightness and left arm numbness that began this morning at 10am , pt denies sob, n/v/d, lower leg pain, and or swelling, pt denies PMH. NKDA.

## 2021-08-29 ENCOUNTER — TRANSCRIPTION ENCOUNTER (OUTPATIENT)
Age: 46
End: 2021-08-29

## 2021-08-29 VITALS
RESPIRATION RATE: 17 BRPM | TEMPERATURE: 98 F | SYSTOLIC BLOOD PRESSURE: 135 MMHG | HEART RATE: 72 BPM | DIASTOLIC BLOOD PRESSURE: 85 MMHG | OXYGEN SATURATION: 100 %

## 2021-08-29 LAB
CK MB BLD-MCNC: 0.6 % — SIGNIFICANT CHANGE UP (ref 0–3.5)
CK MB CFR SERPL CALC: 1.5 NG/ML — SIGNIFICANT CHANGE UP (ref 0.5–3.6)
CK SERPL-CCNC: 243 U/L — SIGNIFICANT CHANGE UP (ref 26–308)
TROPONIN I SERPL-MCNC: <.015 NG/ML — SIGNIFICANT CHANGE UP (ref 0.01–0.04)

## 2021-08-29 PROCEDURE — 99217: CPT

## 2021-08-29 RX ORDER — PSEUDOEPHEDRINE HCL 30 MG
1 TABLET ORAL
Qty: 0 | Refills: 0 | DISCHARGE

## 2021-08-29 RX ORDER — ALBUTEROL 90 UG/1
2 AEROSOL, METERED ORAL
Qty: 0 | Refills: 0 | DISCHARGE

## 2021-08-29 RX ORDER — ASPIRIN/CALCIUM CARB/MAGNESIUM 324 MG
1 TABLET ORAL
Qty: 0 | Refills: 0 | DISCHARGE
Start: 2021-08-29

## 2021-08-29 RX ORDER — DM/PSEUDOEPHED/ACETAMINOPHEN 15-30-325
10 CAPSULE ORAL
Qty: 0 | Refills: 0 | DISCHARGE

## 2021-08-29 NOTE — DISCHARGE NOTE PROVIDER - HOSPITAL COURSE
Patient returned to emergency department via stretcher accompanied by tech.          45 y/o male w/ no sig PMHx, presents to the ED c/o chest pain. Pt reports around 10:30 this morning while driving he developed sudden onset of left sided chest pain radiating to left arm, hand tingling. Pt describes pain as pressure/tight, 5/10 at max, no associated symptoms, and it lasted a few hours thus presented to the ED. Pt reports pain resolved by the time he arrived in the ED. Pt reports he does smoke tobacco however not heavily, he also drink a shot or 2 of vodka nightly, denies drug use or family hx of CAD. 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  In ED vitals stable, EKG no acute ischemia. Pt placed on telemetry observation, trops neg x 3, no events on tele, pt has been chest pain free. Pt w/ low DANAE and HEART score, will follow up with Cardiology as outpt for further evaluation

## 2021-08-29 NOTE — DISCHARGE NOTE NURSING/CASE MANAGEMENT/SOCIAL WORK - NSCORESITESY/N_GEN_A_CORE_RD
Sangita Spence transport here to [facility] patient to [facility]. No acute distress noted. Yes

## 2021-08-29 NOTE — DISCHARGE NOTE NURSING/CASE MANAGEMENT/SOCIAL WORK - PATIENT PORTAL LINK FT
You can access the FollowMyHealth Patient Portal offered by [facility] by registering at the following website: [email or web address]. By joining [employer]’s FollowMyHealth portal, you will also be able to view your health information using other applications (apps) compatible with our system.

## 2021-09-23 ENCOUNTER — TRANSCRIPTION ENCOUNTER (OUTPATIENT)
Age: 46
End: 2021-09-23

## 2022-11-17 NOTE — ASU PREOP CHECKLIST - NS PREOP CHK CHLOROHEX WASH
Level of Care: Med/Surg [1]   Admitting Physician: CARL SIMS [[id number]]   Attending Physician: CARL SIMS [[id number]]  
N/A

## 2024-02-01 ENCOUNTER — EMERGENCY (EMERGENCY)
Facility: HOSPITAL | Age: 49
LOS: 1 days | Discharge: HOME | End: 2024-02-01
Attending: EMERGENCY MEDICINE
Payer: COMMERCIAL

## 2024-02-01 VITALS
RESPIRATION RATE: 18 BRPM | WEIGHT: 179.9 LBS | HEART RATE: 75 BPM | OXYGEN SATURATION: 96 % | SYSTOLIC BLOOD PRESSURE: 156 MMHG | DIASTOLIC BLOOD PRESSURE: 91 MMHG | TEMPERATURE: 98 F | HEIGHT: 67 IN

## 2024-02-01 DIAGNOSIS — Z98.890 OTHER SPECIFIED POSTPROCEDURAL STATES: Chronic | ICD-10-CM

## 2024-02-01 LAB
A1C WITH ESTIMATED AVERAGE GLUCOSE RESULT: 5.8 % — HIGH (ref 4–5.6)
ALBUMIN SERPL ELPH-MCNC: 4.3 G/DL — SIGNIFICANT CHANGE UP (ref 3.3–5)
ALP SERPL-CCNC: 66 U/L — SIGNIFICANT CHANGE UP (ref 40–120)
ALT FLD-CCNC: 20 U/L — SIGNIFICANT CHANGE UP (ref 10–45)
ANION GAP SERPL CALC-SCNC: 8 MMOL/L — SIGNIFICANT CHANGE UP (ref 5–17)
APTT BLD: 34.5 SEC — SIGNIFICANT CHANGE UP (ref 24.5–35.6)
AST SERPL-CCNC: 20 U/L — SIGNIFICANT CHANGE UP (ref 10–40)
BASOPHILS # BLD AUTO: 0.03 K/UL — SIGNIFICANT CHANGE UP (ref 0–0.2)
BASOPHILS NFR BLD AUTO: 0.3 % — SIGNIFICANT CHANGE UP (ref 0–2)
BILIRUB SERPL-MCNC: 0.3 MG/DL — SIGNIFICANT CHANGE UP (ref 0.2–1.2)
BUN SERPL-MCNC: 17 MG/DL — SIGNIFICANT CHANGE UP (ref 7–23)
CALCIUM SERPL-MCNC: 9.4 MG/DL — SIGNIFICANT CHANGE UP (ref 8.4–10.5)
CHLORIDE SERPL-SCNC: 101 MMOL/L — SIGNIFICANT CHANGE UP (ref 96–108)
CHOLEST SERPL-MCNC: 173 MG/DL — SIGNIFICANT CHANGE UP
CO2 SERPL-SCNC: 30 MMOL/L — SIGNIFICANT CHANGE UP (ref 22–31)
CREAT SERPL-MCNC: 0.97 MG/DL — SIGNIFICANT CHANGE UP (ref 0.5–1.3)
EGFR: 96 ML/MIN/1.73M2 — SIGNIFICANT CHANGE UP
EOSINOPHIL # BLD AUTO: 0.28 K/UL — SIGNIFICANT CHANGE UP (ref 0–0.5)
EOSINOPHIL NFR BLD AUTO: 3.2 % — SIGNIFICANT CHANGE UP (ref 0–6)
ESTIMATED AVERAGE GLUCOSE: 120 MG/DL — HIGH (ref 68–114)
FLUAV AG NPH QL: SIGNIFICANT CHANGE UP
FLUBV AG NPH QL: SIGNIFICANT CHANGE UP
GLUCOSE SERPL-MCNC: 100 MG/DL — HIGH (ref 70–99)
HCT VFR BLD CALC: 50.5 % — HIGH (ref 39–50)
HDLC SERPL-MCNC: 49 MG/DL — SIGNIFICANT CHANGE UP
HGB BLD-MCNC: 16.2 G/DL — SIGNIFICANT CHANGE UP (ref 13–17)
IMM GRANULOCYTES NFR BLD AUTO: 0.3 % — SIGNIFICANT CHANGE UP (ref 0–0.9)
INR BLD: 1.04 RATIO — SIGNIFICANT CHANGE UP (ref 0.85–1.18)
LIPID PNL WITH DIRECT LDL SERPL: 107 MG/DL — HIGH
LYMPHOCYTES # BLD AUTO: 3.33 K/UL — HIGH (ref 1–3.3)
LYMPHOCYTES # BLD AUTO: 38.1 % — SIGNIFICANT CHANGE UP (ref 13–44)
MCHC RBC-ENTMCNC: 29.1 PG — SIGNIFICANT CHANGE UP (ref 27–34)
MCHC RBC-ENTMCNC: 32.1 GM/DL — SIGNIFICANT CHANGE UP (ref 32–36)
MCV RBC AUTO: 90.7 FL — SIGNIFICANT CHANGE UP (ref 80–100)
MONOCYTES # BLD AUTO: 0.92 K/UL — HIGH (ref 0–0.9)
MONOCYTES NFR BLD AUTO: 10.5 % — SIGNIFICANT CHANGE UP (ref 2–14)
NEUTROPHILS # BLD AUTO: 4.14 K/UL — SIGNIFICANT CHANGE UP (ref 1.8–7.4)
NEUTROPHILS NFR BLD AUTO: 47.6 % — SIGNIFICANT CHANGE UP (ref 43–77)
NON HDL CHOLESTEROL: 124 MG/DL — SIGNIFICANT CHANGE UP
NRBC # BLD: 0 /100 WBCS — SIGNIFICANT CHANGE UP (ref 0–0)
PLATELET # BLD AUTO: 390 K/UL — SIGNIFICANT CHANGE UP (ref 150–400)
POTASSIUM SERPL-MCNC: 4.6 MMOL/L — SIGNIFICANT CHANGE UP (ref 3.5–5.3)
POTASSIUM SERPL-SCNC: 4.6 MMOL/L — SIGNIFICANT CHANGE UP (ref 3.5–5.3)
PROT SERPL-MCNC: 8 G/DL — SIGNIFICANT CHANGE UP (ref 6–8.3)
PROTHROM AB SERPL-ACNC: 11.4 SEC — SIGNIFICANT CHANGE UP (ref 9.5–13)
RBC # BLD: 5.57 M/UL — SIGNIFICANT CHANGE UP (ref 4.2–5.8)
RBC # FLD: 14.6 % — HIGH (ref 10.3–14.5)
RSV RNA NPH QL NAA+NON-PROBE: SIGNIFICANT CHANGE UP
SARS-COV-2 RNA SPEC QL NAA+PROBE: SIGNIFICANT CHANGE UP
SODIUM SERPL-SCNC: 139 MMOL/L — SIGNIFICANT CHANGE UP (ref 135–145)
TRIGL SERPL-MCNC: 91 MG/DL — SIGNIFICANT CHANGE UP
TROPONIN T, HIGH SENSITIVITY RESULT: 6 NG/L — SIGNIFICANT CHANGE UP (ref 0–51)
WBC # BLD: 8.73 K/UL — SIGNIFICANT CHANGE UP (ref 3.8–10.5)
WBC # FLD AUTO: 8.73 K/UL — SIGNIFICANT CHANGE UP (ref 3.8–10.5)

## 2024-02-01 PROCEDURE — 96375 TX/PRO/DX INJ NEW DRUG ADDON: CPT | Mod: XU

## 2024-02-01 PROCEDURE — 85018 HEMOGLOBIN: CPT

## 2024-02-01 PROCEDURE — 93306 TTE W/DOPPLER COMPLETE: CPT

## 2024-02-01 PROCEDURE — 82435 ASSAY OF BLOOD CHLORIDE: CPT

## 2024-02-01 PROCEDURE — 99291 CRITICAL CARE FIRST HOUR: CPT

## 2024-02-01 PROCEDURE — 70544 MR ANGIOGRAPHY HEAD W/O DYE: CPT | Mod: 26,59

## 2024-02-01 PROCEDURE — 83605 ASSAY OF LACTIC ACID: CPT

## 2024-02-01 PROCEDURE — 71045 X-RAY EXAM CHEST 1 VIEW: CPT | Mod: 26

## 2024-02-01 PROCEDURE — 85014 HEMATOCRIT: CPT

## 2024-02-01 PROCEDURE — 96374 THER/PROPH/DIAG INJ IV PUSH: CPT | Mod: XU

## 2024-02-01 PROCEDURE — 83036 HEMOGLOBIN GLYCOSYLATED A1C: CPT

## 2024-02-01 PROCEDURE — 82947 ASSAY GLUCOSE BLOOD QUANT: CPT

## 2024-02-01 PROCEDURE — 70496 CT ANGIOGRAPHY HEAD: CPT | Mod: 26,MA

## 2024-02-01 PROCEDURE — 85025 COMPLETE CBC W/AUTO DIFF WBC: CPT

## 2024-02-01 PROCEDURE — 93306 TTE W/DOPPLER COMPLETE: CPT | Mod: 26

## 2024-02-01 PROCEDURE — 70553 MRI BRAIN STEM W/O & W/DYE: CPT | Mod: 26,MA

## 2024-02-01 PROCEDURE — 84132 ASSAY OF SERUM POTASSIUM: CPT

## 2024-02-01 PROCEDURE — 93356 MYOCRD STRAIN IMG SPCKL TRCK: CPT

## 2024-02-01 PROCEDURE — 70450 CT HEAD/BRAIN W/O DYE: CPT | Mod: MA

## 2024-02-01 PROCEDURE — G0378: CPT

## 2024-02-01 PROCEDURE — 80053 COMPREHEN METABOLIC PANEL: CPT

## 2024-02-01 PROCEDURE — 84484 ASSAY OF TROPONIN QUANT: CPT

## 2024-02-01 PROCEDURE — 82803 BLOOD GASES ANY COMBINATION: CPT

## 2024-02-01 PROCEDURE — 70549 MR ANGIOGRAPH NECK W/O&W/DYE: CPT | Mod: 26

## 2024-02-01 PROCEDURE — 84295 ASSAY OF SERUM SODIUM: CPT

## 2024-02-01 PROCEDURE — 85610 PROTHROMBIN TIME: CPT

## 2024-02-01 PROCEDURE — 0042T: CPT | Mod: MA

## 2024-02-01 PROCEDURE — 82330 ASSAY OF CALCIUM: CPT

## 2024-02-01 PROCEDURE — 70553 MRI BRAIN STEM W/O & W/DYE: CPT | Mod: MA

## 2024-02-01 PROCEDURE — 70498 CT ANGIOGRAPHY NECK: CPT | Mod: 26,MA

## 2024-02-01 PROCEDURE — 85730 THROMBOPLASTIN TIME PARTIAL: CPT

## 2024-02-01 PROCEDURE — 70498 CT ANGIOGRAPHY NECK: CPT | Mod: MA

## 2024-02-01 PROCEDURE — 70450 CT HEAD/BRAIN W/O DYE: CPT | Mod: 26,MA,59

## 2024-02-01 PROCEDURE — 36415 COLL VENOUS BLD VENIPUNCTURE: CPT

## 2024-02-01 PROCEDURE — 80061 LIPID PANEL: CPT

## 2024-02-01 PROCEDURE — 96376 TX/PRO/DX INJ SAME DRUG ADON: CPT | Mod: XU

## 2024-02-01 PROCEDURE — 70544 MR ANGIOGRAPHY HEAD W/O DYE: CPT

## 2024-02-01 PROCEDURE — A9585: CPT

## 2024-02-01 PROCEDURE — 99291 CRITICAL CARE FIRST HOUR: CPT | Mod: 25

## 2024-02-01 PROCEDURE — 87637 SARSCOV2&INF A&B&RSV AMP PRB: CPT

## 2024-02-01 PROCEDURE — 93005 ELECTROCARDIOGRAM TRACING: CPT

## 2024-02-01 PROCEDURE — 82962 GLUCOSE BLOOD TEST: CPT

## 2024-02-01 PROCEDURE — 70549 MR ANGIOGRAPH NECK W/O&W/DYE: CPT

## 2024-02-01 PROCEDURE — 70496 CT ANGIOGRAPHY HEAD: CPT | Mod: MA

## 2024-02-01 PROCEDURE — 71045 X-RAY EXAM CHEST 1 VIEW: CPT

## 2024-02-01 RX ORDER — CLOPIDOGREL BISULFATE 75 MG/1
75 TABLET, FILM COATED ORAL DAILY
Refills: 0 | Status: DISCONTINUED | OUTPATIENT
Start: 2024-02-01 | End: 2024-02-01

## 2024-02-01 RX ORDER — METOCLOPRAMIDE HCL 10 MG
10 TABLET ORAL ONCE
Refills: 0 | Status: COMPLETED | OUTPATIENT
Start: 2024-02-01 | End: 2024-02-01

## 2024-02-01 RX ORDER — ACETAMINOPHEN 500 MG
1000 TABLET ORAL ONCE
Refills: 0 | Status: COMPLETED | OUTPATIENT
Start: 2024-02-01 | End: 2024-02-01

## 2024-02-01 RX ORDER — ALPRAZOLAM 0.25 MG
0.5 TABLET ORAL ONCE
Refills: 0 | Status: DISCONTINUED | OUTPATIENT
Start: 2024-02-01 | End: 2024-02-01

## 2024-02-01 RX ORDER — ASPIRIN/CALCIUM CARB/MAGNESIUM 324 MG
81 TABLET ORAL DAILY
Refills: 0 | Status: DISCONTINUED | OUTPATIENT
Start: 2024-02-01 | End: 2024-02-01

## 2024-02-01 RX ORDER — ATORVASTATIN CALCIUM 80 MG/1
80 TABLET, FILM COATED ORAL AT BEDTIME
Refills: 0 | Status: DISCONTINUED | OUTPATIENT
Start: 2024-02-01 | End: 2024-02-01

## 2024-02-01 RX ORDER — DIPHENHYDRAMINE HCL 50 MG
25 CAPSULE ORAL ONCE
Refills: 0 | Status: COMPLETED | OUTPATIENT
Start: 2024-02-01 | End: 2024-02-01

## 2024-02-01 RX ADMIN — Medication 0.5 MILLIGRAM(S): at 16:53

## 2024-02-01 RX ADMIN — Medication 10 MILLIGRAM(S): at 22:34

## 2024-02-01 RX ADMIN — Medication 10 MILLIGRAM(S): at 12:48

## 2024-02-01 RX ADMIN — Medication 400 MILLIGRAM(S): at 20:01

## 2024-02-01 RX ADMIN — Medication 400 MILLIGRAM(S): at 13:10

## 2024-02-01 RX ADMIN — CLOPIDOGREL BISULFATE 75 MILLIGRAM(S): 75 TABLET, FILM COATED ORAL at 15:00

## 2024-02-01 RX ADMIN — Medication 81 MILLIGRAM(S): at 14:59

## 2024-02-01 RX ADMIN — ATORVASTATIN CALCIUM 80 MILLIGRAM(S): 80 TABLET, FILM COATED ORAL at 22:08

## 2024-02-01 RX ADMIN — Medication 25 MILLIGRAM(S): at 22:08

## 2024-02-01 NOTE — ED PROVIDER NOTE - CLINICAL SUMMARY MEDICAL DECISION MAKING FREE TEXT BOX
Patient is a 48 year-old-male with no known PMH presents with headache and L sided numbness/weakness. Code stroke activated at triage. NIH stroke scale of 1 due to decreased sensation in the LUE/LLE, but no drift appreciated. Rest of exam remarkable for 4+/5 strength in the LUE/LLE. Neurology at bedside. Will obtain labs and neuro imaging. Disposition pending workup and neurology evaluation. Not a TNK candidate at this time given out of window and low NIH stroke scale.

## 2024-02-01 NOTE — ED CDU PROVIDER DISPOSITION NOTE - PATIENT PORTAL LINK FT
You can access the FollowMyHealth Patient Portal offered by [facility] by registering at the following website: [email or web address]. By joining [employer]’s FollowMyHealth portal, you will also be able to view your health information using other applications (apps) compatible with our system.

## 2024-02-01 NOTE — ED CDU PROVIDER INITIAL DAY NOTE - ATTENDING APP SHARED VISIT CONTRIBUTION OF CARE
Attending MD Alcocer: I personally made/approved the management plan and take responsibility for the patient management.          *The above represents an initial assessment/impression. Please refer to progress notes for potential changes in patient clinical course*

## 2024-02-01 NOTE — ED PROVIDER NOTE - OTHER FINDINGS
ECG recorded at 1250 independently interpreted by me, Dr Francisco Alcocer, shows normal sinus rhythm incomplete right bundle branch block T wave inversions lead V1 to lead V3 no acute diagnostic ischemic ST-T changes no priors for comparison

## 2024-02-01 NOTE — ED ADULT NURSE NOTE - NSFALLUNIVINTERV_ED_ALL_ED
Bed/Stretcher in lowest position, wheels locked, appropriate side rails in place/Call bell, personal items and telephone in reach/Instruct patient to call for assistance before getting out of bed/chair/stretcher/Non-slip footwear applied when patient is off stretcher/[place] to call system/Physically safe environment - no spills, clutter or unnecessary equipment/Purposeful proactive rounding/Room/bathroom lighting operational, light cord in reach

## 2024-02-01 NOTE — CHART NOTE - NSCHARTNOTEFT_GEN_A_CORE
MRI Head/MRA Head/Neck performed:    BRAIN MRI:    Negative for recent infarct. Linear cortical signal at left cerebellum   corresponds to CT finding compatible with chronic infarct or other   cytotoxic injury. Much smaller focus seen on the right as well.    Supratentorial brain unremarkable.    No mass or pathologic enhancement.    BRAIN MRA:  Unremarkable.    NECK MRA:  Unremarkable.    -----------------------------------------    Case discussed with stroke fellow Dr. Juan Manuel Santos. Pt ok to f/u outpatient with neurology. From stroke perspective, does not need aspirin/plavix or statin at this time.

## 2024-02-01 NOTE — ED PROVIDER NOTE - OBJECTIVE STATEMENT
Patient is a 48 year-old-male with no known PMH presents with headache and L sided numbness/weakness. Code stroke activated at triage. Patient reports that he has been having headache since Saturday, progressively worsen, then around 8am this morning started having numbness/weakness in the L arm/leg. LKN last night but was normal this morning. Denies fever at home, chest pain, shortness of breath, abdominal pain.

## 2024-02-01 NOTE — CONSULT NOTE ADULT - SUBJECTIVE AND OBJECTIVE BOX
Neurology - Consult Note    -  Spectra: [phone] ([facility]), [phone] ([facility])  -    HPI: Patient DEISI SHIN is a 48y (1975) man with a PMHx significant for HTN presented as code stroke for headache, blurry vision, left arm numbness. Patient reports Saturday he had a posterior headache with blurry vision b/l. The next day started to feel his left arm was  numb 85% less sensation compared to right arm. He also had a 2 minute episode of vertigo yesterday. Initially was a 3/10 progressed to 10/10 within 1 day. Headache is nonpositional, Denies N/V, changes to speech, vision loss, weakness.       NIHSS 4  mRS 0  LKN 10 pm 1/31      Review of Systems:    All other review of systems is negative unless indicated above.    Allergies:  Zithromax (Other)      PMHx/PSHx/Family Hx: As above, otherwise see below   No pertinent past medical history    Asthma        Social Hx:  No current use of tobacco, alcohol, or illicit drugs      Medications:  MEDICATIONS  (STANDING):    MEDICATIONS  (PRN):      Vitals:  T(C): 36.4 (02-01-24 @ 12:16), Max: 36.4 (02-01-24 @ 12:16)  HR: 77 (02-01-24 @ 13:12) (75 - 77)  BP: 132/105 (02-01-24 @ 13:12) (132/105 - 156/91)  RR: 21 (02-01-24 @ 13:12) (18 - 21)  SpO2: 97% (02-01-24 @ 13:12) (96% - 97%)    Physical Examination:   General - NAD      Neurologic Exam:  Mental status - Awake, Alert, Oriented to person, place, and time. Speech fluent, repetition and naming intact. Follows simple and complex commands.     Cranial nerves - PERRL, VFF, EOMI, face sensation (V1-V3) intact b/l, left face slow to activation, hearing intact b/l, palate with symmetric elevation, trapezius 5/5 strength b/l, tongue midline on protrusion with full lateral movement    Motor - Normal bulk and tone throughout. No pronator drift.  Strength testing            Deltoid      Biceps      Triceps     Wrist Extension    Wrist Flexion         R            5                 5               5                     5                              5                        5                   L             5                 5               5                     5                              5                        5                               Hip Flexion    Hip Extension    Knee Flexion    Knee Extension    Dorsiflexion    Plantar Flexion  R              5                           5                       5                           5                            5                          5  L              5                           5                        5                           5                            5                          5    Sensation - Light touch decreased to left arm      Coordination - mild dysmetria to left arm . No tremors appreciated    Gait and station - Deferred due to fall/safety risk     Labs:                        16.2   8.73  )-----------( 390      ( 01 Feb 2024 12:31 )             50.5     02-01    139  |  101  |  17  ----------------------------<  100<H>  4.6   |  30  |  0.97    Ca    9.4      01 Feb 2024 12:31    TPro  8.0  /  Alb  4.3  /  TBili  0.3  /  DBili  x   /  AST  20  /  ALT  20  /  AlkPhos  66  02-01    CAPILLARY BLOOD GLUCOSE  112 (01 Feb 2024 12:34)      POCT Blood Glucose.: 112 mg/dL (01 Feb 2024 12:20)    LIVER FUNCTIONS - ( 01 Feb 2024 12:31 )  Alb: 4.3 g/dL / Pro: 8.0 g/dL / ALK PHOS: 66 U/L / ALT: 20 U/L / AST: 20 U/L / GGT: x             PT/INR - ( 01 Feb 2024 12:31 )   PT: 11.4 sec;   INR: 1.04 ratio         PTT - ( 01 Feb 2024 12:31 )  PTT:34.5 sec        Radiology:  < from: CT Angio Brain Stroke Protocol  w/ IV Cont (02.01.24 @ 12:46) >  CT PERFUSION: No perfusion deficit.    CTA NECK AND HEAD: No large vessel occlusion or major stenosis.    < from: CT Brain Stroke Protocol (02.01.24 @ 12:47) >  IMPRESSION: No acute intracranial hemorrhage, mass effect, or shift of   the midline structures.    Age indeterminate infarct within the left cerebellar hemisphere.      The bilateral intradural vertebral arteries, vertebrobasilar junction,   basilar artery, and basilar tip appear unremarkable as well as the   bilateral posterior cerebral arteries.    The imaged intracranial venous structures are patent.

## 2024-02-01 NOTE — ED CDU PROVIDER DISPOSITION NOTE - NSCDUDISPOSITION_ED_ALL_ED_DT
01-Feb-2024 15:18 35 yo man, hx of alcohol use disorder presents with possible ingestion of K2. Per EMS, mother was worried patient was pacing and forth and thinks he may have smoked K2. Patient denies K2 and cannabis use    Patient reports he does not know why he is here 02-Feb-2024 01:10

## 2024-02-01 NOTE — CONSULT NOTE ADULT - ASSESSMENT
48y (1975) man with a PMHx significant for HTN presented as code stroke for headache, blurry vision, left arm numbness. Patient reports Saturday he had a posterior headache with blurry vision b/l. The next day started to feel his left arm was  numb 85% less sensation compared to right arm. On exam, mild left dysmetria, numbness to left arm. CTP normal CTA No large vessel occlusion or major stenosis,  bilateral intradural vertebral arteries, vertebrobasilar junction, basilar artery, and basilar tip appear unremarkableimaged intracranial venous structures are patent.     Not Tenecteplase candidate due to out of windo  Not thrombectomy candidate due to no LVO    Impression: Left arm numbness with mild weakness and left nasolabial fold flattening due to right brain dysfunction possibly from acute ischemic stroke   CTH shows Age indeterminate infarct within the left cerebellar hemisphere    Recommendation:     Imaging/Labs  [] MRI brain w/o  [] MRA H/N, with T1 Fat Sat to rule out dissection   [] TTE with bubble study   [] HbA1C and Lipid Panel    Meds  [] Aspirin 81MG PO daily (ASA 300MG MD daily if patient fails dysphagia screen)  [] Plavix 75 mg daily for 3 weeks  [] Atorvastatin 80MG QHS, titrate to LDL<70  [] DVT prophylaxis - Lovenox 40MG SC daily    Other  [] Telemonitoring; Neurochecks and vital signs per unit protocol, Q4H  [] Permissive HTN up to 220/120 mmHg for first 24 hours after symptom onset followed by gradual normotension.   [] BG goal <180, avoid hypoglycemia  [] NPO until clears dysphagia screen, otherwise swallow evaluation  [] Fall, aspiration precautions  [] Stroke education provided     Case discussed with stroke fellow Dr. Santos undersupervision of stroke attending Dr. Yang.  48y (1975) man with a PMHx significant for HTN presented as code stroke for headache, blurry vision, left arm numbness. Patient reports Saturday he had a posterior headache with blurry vision b/l. The next day started to feel his left arm was  numb 85% less sensation compared to right arm. On exam, mild left dysmetria, numbness to left arm. CTP normal CTA No large vessel occlusion or major stenosis,  bilateral intradural vertebral arteries, vertebrobasilar junction, basilar artery, and basilar tip appear unremarkableimaged intracranial venous structures are patent.     Not Tenecteplase candidate due to out of windo  Not thrombectomy candidate due to no LVO    Impression: Left arm numbness with mild weakness and left nasolabial fold flattening due to right brain dysfunction possibly from acute ischemic stroke   CTH shows Age indeterminate infarct within the left cerebellar hemisphere, appears subacute     Recommendation:     Imaging/Labs  [] MRI brain w/o  [] MRA H/N, with T1 Fat Sat to rule out dissection   [] TTE with bubble study   [] HbA1C and Lipid Panel    Meds  [] Aspirin 81MG PO daily (ASA 300MG [place] daily if patient fails dysphagia screen)  [] Plavix 75 mg daily for 3 weeks  [] Atorvastatin 80MG QHS, titrate to LDL<70  [] DVT prophylaxis - Lovenox 40MG SC daily    Other  [] Telemonitoring; Neurochecks and vital signs per unit protocol, Q4H  [] Permissive HTN up to 220/120 mmHg for first 24 hours after symptom onset followed by gradual normotension.   [] BG goal <180, avoid hypoglycemia  [] NPO until clears dysphagia screen, otherwise swallow evaluation  [] Fall, aspiration precautions  [] Stroke education provided     Case discussed with stroke fellow Dr. Santos undersupervision of stroke attending Dr. Yang.  48y (1975) man with a PMHx significant for HTN presented as code stroke for headache, blurry vision, left arm numbness. Patient reports Saturday he had a posterior headache with blurry vision b/l. The next day started to feel his left arm was  numb 85% less sensation compared to right arm. On exam, mild left dysmetria, numbness to left arm. CTP normal CTA No large vessel occlusion or major stenosis,  bilateral intradural vertebral arteries, vertebrobasilar junction, basilar artery, and basilar tip appear unremarkableimaged intracranial venous structures are patent.     Not Tenecteplase candidate due to out of windo  Not thrombectomy candidate due to no LVO    Impression: Left arm numbness with mild weakness and left nasolabial fold flattening due to right brain dysfunction possibly from acute ischemic stroke   CTH shows Age indeterminate infarct within the left cerebellar hemisphere, appears subacute     Recommendation:     Imaging/Labs  [] MRI brain w/o  [] MRA H/N, with T1 Fat Sat to rule out dissection   [] TTE with bubble study   [] HbA1C and Lipid Panel    Meds  [] Aspirin 81MG PO daily (ASA 300MG [place] daily if patient fails dysphagia screen)  [] Plavix 75 mg daily for 3 weeks  [] Atorvastatin 80MG QHS, titrate to LDL<70  [] Headache treatment: give the following all together: tylenol 1g IV, Reglan 10MG IVP, Benadryl 25MG IVP (for extrapyramidal side effects of reglan) given Q8HR PRN for HA and repeat 2-3 times. If refractory to above, can trial solumedrol 125MG IV x1  [] DVT prophylaxis - Lovenox 40MG SC daily    Other  [] Telemonitoring; Neurochecks and vital signs per unit protocol, Q4H  [] Permissive HTN up to 220/120 mmHg for first 24 hours after symptom onset followed by gradual normotension.   [] BG goal <180, avoid hypoglycemia  [] NPO until clears dysphagia screen, otherwise swallow evaluation  [] Fall, aspiration precautions  [] Stroke education provided     Case discussed with stroke fellow Dr. Santos undersupervision of stroke attending Dr. Yang.

## 2024-02-01 NOTE — ED CDU PROVIDER DISPOSITION NOTE - NSFOLLOWUPINSTRUCTIONS_ED_ALL_ED_FT
Please make sure to follow up with your primary care doctor within 1-2 days and with the NEUROLOGY specialist. The information for follow up can be found below. Bring a copy of all of your results with you to your follow up appointments.   Return to the ER as discussed if you develop any new or worsening symptoms.    Please start taking:  ASPIRIN 81MG daily  PLAVIX 75MG daily  ATORVASTATIN 80mf daily    NEUROLOGY: Hydrate.     You can take Tylenol 1000mg and/or Motrin 600mg every 6 hours as needed for pain.     You may be contacted by our Emergency Department Referrals Coordinator to set up your follow up appointment within 24-48 hours of your discharge Monday- Friday: Pulmonology and Cardiology.    We recommend you follow up with your primary care provider within the next 2-3 days, please bring all of your results with you.     You can also follow up with NEUROLOGY Dr. Ruiz. Call the phone number listed below today to make a follow up appointment.   [address], [address]  [place] [address]  Phone: [phone]  Fax: [phone]    Please return to the Emergency Department with new, worsening, or concerning symptoms, such as:  -Severe, worsening headache  -Vision changes  -Uncontrollable vomiting  -Dizziness, syncope  -Pressure, pain, tightness in chest  -Facial drooping, arm weakness, or speech difficulty   -Head injury or loss of consciousness   -Nonstop bleeding or an open wound     *More detailed information regarding your visit and discharge can be found by reviewing this packet

## 2024-02-01 NOTE — ED CDU PROVIDER INITIAL DAY NOTE - PHYSICAL EXAMINATION
CONSTITUTIONAL: Well appearing and in no apparent distress.  ENT: Airway patent, moist mucous membranes.   EYES: Pupils equal, round and reactive to light. EOMI. Conjunctiva normal appearing.   CARDIAC: Normal rate, regular rhythm.  Heart sounds S1, S2.    RESPIRATORY: Breath sounds clear and equal bilaterally.   GASTROINTESTINAL: Abdomen soft, non-tender, not distended.  MUSCULOSKELETAL: Spine appears normal.  NEUROLOGICAL: Alert and oriented x3, no focal deficits, 4/5 muscle strength LUE/LLE, 5/5 RUE/RLE. Decreased sensation to left arm.  No facial asymmetry. Speech clear, gait steady.   SKIN: Skin normal color, warm, dry and intact.   PSYCHIATRIC: Normal mood and affect.

## 2024-02-01 NOTE — ED ADULT NURSE REASSESSMENT NOTE - NS ED NURSE REASSESS COMMENT FT1
Pt received from FAUSTINO Moore. Pt oriented to CDU & plan of care was discussed. Pt A&O x 4. Pt in CDU for HA and L arm numbness, awaiting MRI, MRA and TTE with bubble study. Pt states HA 3/10 pain, SHELLY Aguero aware. Pt denies any chest pain, SOB, dizziness or palpitations at this time. RN performed neuro check on pt. Pt able to follow all commands. Pt states decreased sensation and weakness in L arm and L leg. Pt states more difficultly in keeping L arm and L leg elevated. Pulse and motor present and equal in all 4 extremities, no arm or leg drift present. Pt in NSR on CM, HR 70s. V/S stable, pt afebrile, IV in place, patent and free of signs of infiltration. Pt resting in bed. Safety & comfort measures maintained. Call bell in reach. Will continue to monitor.

## 2024-02-01 NOTE — ED PROVIDER NOTE - ATTENDING CONTRIBUTION TO CARE
Attending MD Alcocer:  I have seen and examined this patient and fully participated in the care of this patient as the teaching attending. I personally made/approved the management plan and take responsibility for the patient management.      48-year-old gentleman with no known medical problems presenting for evaluation of headache cough for about 4 to 5 days, states the headache was posterior and now is generalized to the entire scalp, it was not instantly peaking.  Sharp in nature no photophobia or phonophobia.  Does admit to some associated blurred vision but no double vision.  Today around 8 AM when he was at work he developed onset of weakness of the left hand and numbness.  Code stroke was activated in triage.    Patient's vital signs are nonactionable sitting in the stretcher no apparent distress.  Normal affect.  Clear lungs posteriorly regular heart sounds extremities warm and well-perfused.        NIH Stroke Scale Calculator    1.  a. Level of consciousness: Alert (0 points)      b. Patient asked current month and age: Answers both correctly (0 points)      c. Patient asked to open And close eyes: Obeys both correctly (0 points)  2.  Best gaze: Normal (0 points)  3.  Visual field testing: No visual field loss (0 points)  4.  Facial paresis: Normal symmetrical movement (0 points)  5.  a. Motor function - left arm: Drift (1 point)      b. Motor function - right arm: Normal (0 points)  6.  a. Motor function - left leg: Drift (1 point)      b. Motor function - right leg: Normal (0 points)  7.  Limb ataxia: No ataxia (0 points)  8.  Sensory: Mild to moderate decrease in sensation (1 point)  9.  Best language: No aphasia (0 points)  10. Dysarthria: Normal articulation (0 points)  11. Exinction and inattention: Normal (0 points)    Total: 3 points.      Patient underwent CT head CT angiogram head and neck and perfusion as per stroke neurology recommendations.  Neuroimaging was notable for possible age-indeterminate infarct in the left cerebellar hemisphere, concern would be for possible mild stroke associated with current symptoms.  Recommendations at this time from stroke neurology would be to obtain MRI brain to further investigate.  Patient is outside of the window for consideration of thrombolysis, there was no large vessel occlusion seen on vessel imaging.      *The above represents an initial assessment/impression. Please refer to progress notes for potential changes in patient clinical course*

## 2024-02-01 NOTE — ED CDU PROVIDER INITIAL DAY NOTE - OBJECTIVE STATEMENT
47 yo M with a PMH of HTN presented as code stroke for headache, blurry vision, left arm numbness. Reports feeling a headache and bilateral blurred vision since Saturday. Headache has been constant since onset. Blurred vision resolved. Today woke up at 8AM and felt L sided numbness. States he feels loss of sensation to LUE. Reports dizziness feels like room spinning, last experienced yesterday. Headache not the worst of his life, diffuse and pressure like. Not positional. Denies nausea, vomiting, fever, chills, chest pain, changes in speech, facial asymmetry, gait abnormality.       8 year-old-male with no known PMH presents with headache and L sided numbness/weakness. Code stroke activated at triage. Patient reports that he has been having headache since Saturday, progressively worsen, then around 8am this morning started having numbness/weakness in the L arm/leg. LKN last night but was normal this morning. Denies fever at home, chest pain, shortness of breath, abdominal pain. 47 yo M with a PMH of HTN presented as code stroke for headache, blurry vision, left arm numbness. Reports feeling a headache and bilateral blurred vision since Saturday. Headache has been constant since onset. Blurred vision resolved. Today woke up at 8AM and felt L sided numbness. States he feels loss of sensation to LUE. Reports dizziness feels like room spinning, last experienced yesterday. Headache not the worst of his life, diffuse and pressure like. Not positional. Denies nausea, vomiting, fever, chills, chest pain, changes in speech, facial asymmetry, gait abnormality.

## 2024-02-01 NOTE — ED ADULT TRIAGE NOTE - CHIEF COMPLAINT QUOTE
headahce since Saturday-worse today, reports left arm numbness, blurry vision b/l starting today am.

## 2024-02-01 NOTE — CONSULT NOTE ADULT - ATTENDING COMMENTS
2/2/23  asa and statin for possible prior stroke   outpatient f/u   Gucci Ruiz MD  Vascular Neurology  Office: [phone]

## 2024-02-01 NOTE — ED CDU PROVIDER DISPOSITION NOTE - CLINICAL COURSE
49 yo M with a PMH of HTN presented as code stroke for headache, blurry vision, left arm numbness. Reports feeling a headache and bilateral blurred vision since Saturday. Headache has been constant since onset. Blurred vision resolved. Today woke up at 8AM and felt L sided numbness. States he feels loss of sensation to LUE. Reports dizziness feels like room spinning, last experienced yesterday. Headache not the worst of his life, diffuse and pressure like. Not positional. Denies nausea, vomiting, fever, chills, chest pain, changes in speech, facial asymmetry, gait abnormality.   In the ED, pt was a stroke code. BP noted to be elevated. Labs non actionable. CTH/CTAs significant for age indeterminate infarct within L cerebellar hemisphere. Neuro recommending ASA, plavix and high dose statin. Pt was given IV ofirmev/reglan. BP improved after symptomatic relief. Still c/o decreased sensation to left arm. Pt placed in CDU for stroke w/u including MRI/MRAs, ECHO and tele monitoring.   In the CDU*** 47 yo M with a PMH of HTN presented as code stroke for headache, blurry vision, left arm numbness. Reports feeling a headache and bilateral blurred vision since Saturday. Headache has been constant since onset. Blurred vision resolved. Today woke up at 8AM and felt L sided numbness. States he feels loss of sensation to LUE. Reports dizziness feels like room spinning, last experienced yesterday. Headache not the worst of his life, diffuse and pressure like. Not positional. Denies nausea, vomiting, fever, chills, chest pain, changes in speech, facial asymmetry, gait abnormality.   In the ED, pt was a stroke code. BP noted to be elevated. Labs non actionable. CTH/CTAs significant for age indeterminate infarct within L cerebellar hemisphere. Neuro recommending ASA, plavix and high dose statin. Pt was given IV ofirmev/reglan. BP improved after symptomatic relief. Still c/o decreased sensation to left arm. Pt placed in CDU for stroke w/u including MRI/MRAs, ECHO and tele monitoring.   In the CDU, patient had MRI 47 yo M with a PMH of HTN presented as code stroke for headache, blurry vision, left arm numbness. Reports feeling a headache and bilateral blurred vision since Saturday. Headache has been constant since onset. Blurred vision resolved. Today woke up at 8AM and felt L sided numbness. States he feels loss of sensation to LUE. Reports dizziness feels like room spinning, last experienced yesterday. Headache not the worst of his life, diffuse and pressure like. Not positional. Denies nausea, vomiting, fever, chills, chest pain, changes in speech, facial asymmetry, gait abnormality.   In the ED, pt was a stroke code. BP noted to be elevated. Labs non actionable. CTH/CTAs significant for age indeterminate infarct within L cerebellar hemisphere. Neuro recommending ASA, plavix and high dose statin. Pt was given IV ofirmev/reglan. BP improved after symptomatic relief. Still c/o decreased sensation to left arm. Pt placed in CDU for stroke w/u including MRI/MRAs, ECHO and tele monitoring.   In the CDU, patient had MRI and echocardiogram. Cleared by neurology who discontinued Aspirin/Plavix/Atorvastatin.  Discussed with CDU attending Dr. Egan, patient discharged home.

## 2024-02-01 NOTE — ED PROVIDER NOTE - PHYSICAL EXAMINATION
Vitals: I have reviewed the patients vital signs  General: Well dressed, well appearing, no acute distress  HEENT: Atraumatic, normocephalic, airway patent  Eyes: EOMI, tracking appropriately  Neck: no tracheal deviation, no JVD  Chest/Lungs: CTA BL   Heart: skin and extremities well perfused, regular rate and rhythm  Abdomen: soft, nontender and nondistended   Neuro: A+Ox3, CN Ii-XI intact, no facial droop appreciated, no dysarthria/slurred speech appreciated, intact finger-to-nose test bilaterally, intact gait   MSK: no arm/leg drift appreciated, 4+/5 strength in the LUE/LLE but 5/5 strength in the RUE/RLE  Skin: no cyanosis, no jaundice, no new emergent lesions     Stroke Scale: 1  1A level of consciousness: 0   1B month and age: 0   1C commands: 0  2 horizontal gaze: 0  3 visual fields: 0  4 facial palsy: 0   5A left arm motor drift: 0  5B right arm motor drift: 0  6A left leg motor drift: 0  6B right leg motor drift: 0  7 limb ataxia: 0  8 sensation: 1  9 language/aphasia: 0  10 dysarthria: 0   11 extinction/inattention: 0

## 2024-02-01 NOTE — ED ADULT NURSE NOTE - OBJECTIVE STATEMENT
47 y/o male arrives to the ER complaining of headache.  Patient reports having headache and cough since last Saturday. Pt. reports worsening headache today, and additionally endorses blurry vision and L side facial numbness and L side arm numbness and decreased strength since 8AM today . LKN last night.   Reports vision returned to normal al arrival, endorses headache and neck pain. Upon arrival to ED, IV placed with labs drawn and sent to lab, patient weighed and brought to CT, dysphagia screening and neuro check completed. Upon assessment, patient eyes are bilateral PERRL, A&Ox3 breathing spontaneous and unlabored with equal chest rise and fall, skin color normal for ethnicity. Patient undressed and placed into gown, side rails up with bed locked and in lowest position for safety. [place] provided. Comfort and safety provided. Educated not to ambulate without assistance. 47 y/o male arrives to the ER complaining of headache.  Patient reports having headache and cough since last Saturday. Pt. reports worsening headache today, and additionally endorses blurry vision and L side facial numbness and L side arm numbness and decreased strength since 8AM today . LKN last night.   Denies blurry vision at arrival, however endorses headache and neck pain. Upon arrival to ED, IV placed with labs drawn and sent to lab, patient weighed and brought to CT, dysphagia screening and neuro check completed. Upon assessment, patient eyes are bilateral PERRL, A&Ox3, following commands, breathing spontaneous and unlabored with equal chest rise and fall, skin color normal for ethnicity. Patient undressed and placed into gown, side rails up with bed locked and in lowest position for safety. [place] provided. Comfort and safety provided. Educated not to ambulate without assistance.

## 2024-02-02 VITALS
HEART RATE: 75 BPM | OXYGEN SATURATION: 95 % | SYSTOLIC BLOOD PRESSURE: 123 MMHG | TEMPERATURE: 98 F | RESPIRATION RATE: 17 BRPM | DIASTOLIC BLOOD PRESSURE: 90 MMHG

## 2024-02-02 PROCEDURE — 99238 HOSP IP/OBS DSCHRG MGMT 30/<: CPT

## 2024-02-02 RX ADMIN — Medication 1000 MILLIGRAM(S): at 00:19

## 2024-02-02 NOTE — ED CDU PROVIDER SUBSEQUENT DAY NOTE - HISTORY
Patient endorses some improvement in headache after Benadryl and Reglan. States that he would like to go home. CXR (prelim) without acute pathology. Patient with steady gait, NAD, with stable vitals. Case and results discussed earlier with Dr. Egan- patient cleared for discharge home. Will discharge home with follow up with PCP, Pulmonology, Cardiology, and Neurology. All questions answered. Patient accompanied by family member. - Saige Meol PA-C Patient endorses some improvement in headache after Benadryl and Reglan. Offered patient to stay until the morning to continued pain control, reevaluation and reassessment in the setting of headache. Patient states that he would like to go home. He states that he does not like taking pain medication. I spoke with neurology resident who states that patient can take Tylenol and Motrin for pain. CXR (prelim) without acute pathology. Patient with steady gait, NAD, with stable vitals. Case and results discussed earlier with Dr. Egan- patient cleared for discharge home. Will discharge home with follow up with PCP, Pulmonology, Cardiology, and Neurology. All questions answered. Patient accompanied by family member. - Saige Melo PA-C

## 2024-02-02 NOTE — ED CDU PROVIDER SUBSEQUENT DAY NOTE - PHYSICAL EXAMINATION
CONSTITUTIONAL: Well appearing and in no apparent distress.  	ENT: Airway patent, moist mucous membranes.   	EYES: Pupils equal, round and reactive to light. EOMI. Conjunctiva normal appearing.   	CARDIAC: Normal rate, regular rhythm.  Heart sounds S1, S2.    	RESPIRATORY: Breath sounds clear and equal bilaterally.   	GASTROINTESTINAL: Abdomen soft, non-tender, not distended.  	MUSCULOSKELETAL: Spine appears normal. Steady gait.   	NEUROLOGICAL: Alert and oriented x3, 5/5 muscle strength LUE/LLE, 5/5 RUE/RLE. Sensation grossly intact.  No facial asymmetry. Speech clear.   	SKIN: Skin normal color, warm, dry and intact.   PSYCHIATRIC: Normal mood and affect.

## 2024-02-02 NOTE — ED CDU PROVIDER SUBSEQUENT DAY NOTE - ATTENDING APP SHARED VISIT CONTRIBUTION OF CARE
Attending Contribution to Care: I personally have seen and examined this patient.  Physician assistant note reviewed and agree on plan of care and except where noted. Patient re-evaluated and doing well.  No acute issues at  this time.  Lab and radiology tests reviewed with patient and/or family.  Patient stable for discharge. worked up for stroke. CTH/CTAs significant for age indeterminate infarct within L cerebellar hemisphere. Neuro recommending ASA, plavix and high dose statin.  MRI/MRAs, ECHO and tele monitoring wnl cleared by neuro for outpt follow up.

## 2024-02-07 ENCOUNTER — NON-APPOINTMENT (OUTPATIENT)
Age: 49
End: 2024-02-07

## 2024-02-15 ENCOUNTER — APPOINTMENT (OUTPATIENT)
Dept: PULMONOLOGY | Facility: CLINIC | Age: 49
End: 2024-02-15
Payer: COMMERCIAL

## 2024-02-15 VITALS
OXYGEN SATURATION: 97 % | DIASTOLIC BLOOD PRESSURE: 73 MMHG | BODY MASS INDEX: 28.25 KG/M2 | HEIGHT: 67 IN | WEIGHT: 180 LBS | HEART RATE: 75 BPM | SYSTOLIC BLOOD PRESSURE: 122 MMHG

## 2024-02-15 DIAGNOSIS — Z76.89 PERSONS ENCOUNTERING HEALTH SERVICES IN OTHER SPECIFIED CIRCUMSTANCES: ICD-10-CM

## 2024-02-15 DIAGNOSIS — R05.3 CHRONIC COUGH: ICD-10-CM

## 2024-02-15 DIAGNOSIS — J98.4 OTHER DISORDERS OF LUNG: ICD-10-CM

## 2024-02-15 PROCEDURE — 94727 GAS DIL/WSHOT DETER LNG VOL: CPT

## 2024-02-15 PROCEDURE — ZZZZZ: CPT

## 2024-02-15 PROCEDURE — 94729 DIFFUSING CAPACITY: CPT

## 2024-02-15 PROCEDURE — 94010 BREATHING CAPACITY TEST: CPT

## 2024-02-15 PROCEDURE — 99204 OFFICE O/P NEW MOD 45 MIN: CPT | Mod: 25

## 2024-02-15 NOTE — HISTORY OF PRESENT ILLNESS
[Former] : former [Awakes Unrefreshed] : awakes unrefreshed [Fatigue] : fatigue [Recent  Weight Gain] : recent weight gain [Snoring] : snoring [Tired while Driving] : tired while driving [Unintentional Sleep while Inactive] : unintentional sleep while inactive [TextBox_4] : DEISI SHIN is a 48 year old  M referred for pulmonary evaluation for   In ER 2 weeks ago for severe headaches and dx. CVA. Told may be old.  Sent to neurologist. No significant upper airway congestion. No GERD.  Referred for persistent cough Had COVID in 11/23 and 1 year prior. Cough lingered after COVID then resolved. Developed URI 2 weeks ago and cough recurred. Since COVID whenever gets ill gets severe prolonged cough. No wheezing. Some SOB  Had CXR in ER told negative.  For f/u CT ordered.  Concerned re: DAPHNEY.   Past pulmonary history. 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 N Recent travel N Pets N [TextBox_11] : 1-2 cigs/day [TextBox_13] : 30 [YearQuit] : 2024 [Awakes with Headache] : does not awaken with headache

## 2024-02-15 NOTE — PROCEDURE
[FreeTextEntry1] : 02/15/2024 Pulmonary function testing There is a moderate ventilatory impairment in a restrictive pattern There is elevation in the RV/TLC ratio indicative of possible air trapping. Diffusion capacity is normal.

## 2024-02-15 NOTE — ASSESSMENT
[FreeTextEntry1] : The pathophysiology as well as medical implications of untreated obstructive sleep apnea syndrome were discussed with this patient. Treatment options including CPAP therapy, Inspire,  mandibular advancement device and weight loss were also discussed.  For F/u CT. ordered by primary care doctor.  Will review.  HSS Medrol Dosepak for anti-inflammatory effect. Follow-up in a few weeks time if cough does not resolve.  Otherwise we will follow-up post sleep study.

## 2024-02-15 NOTE — CONSULT LETTER
[DrDeisy  ___] : Dr. KENNY [Dear  ___] : Dear ~ANDRIA, [Consult Letter:] : I had the pleasure of evaluating your patient, [unfilled]. [Consult Closing:] : Thank you very much for allowing me to participate in the care of this patient.  If you have any questions, please do not hesitate to contact me. [Sincerely,] : Sincerely, [FreeTextEntry2] : Pepito Singh MD [FreeTextEntry3] : [facility]

## 2024-02-15 NOTE — DISCUSSION/SUMMARY
[FreeTextEntry1] : Cough hypersensitivity syndrome.  Possible etiologies include Upper airway cough syndrome.  Cannot exclude component. Laryngeal pharyngeal reflux disease.  No definitive evidence. Cough variant asthma.  Consideration in this patient.  Possible form of extremely mild airways reactivity only manifested in the face of upper respiratory infections. Nonasthmatic eosinophilic bronchitis.  Probable component postinfectious. Other etiologies less likely.  Probable obstructive sleep apnea syndrome.  Restrictive physiology.  More likely related to body habitus than parenchymal lung or neuromuscular disease.  No evidence of significant airflow limitation.

## 2024-03-05 ENCOUNTER — APPOINTMENT (OUTPATIENT)
Dept: PULMONOLOGY | Facility: CLINIC | Age: 49
End: 2024-03-05
Payer: COMMERCIAL

## 2024-03-05 PROCEDURE — 95800 SLP STDY UNATTENDED: CPT

## 2024-03-06 PROCEDURE — 95800 SLP STDY UNATTENDED: CPT | Mod: 52

## 2024-03-25 ENCOUNTER — APPOINTMENT (OUTPATIENT)
Dept: PULMONOLOGY | Facility: CLINIC | Age: 49
End: 2024-03-25
Payer: COMMERCIAL

## 2024-03-25 VITALS — OXYGEN SATURATION: 98 % | DIASTOLIC BLOOD PRESSURE: 83 MMHG | SYSTOLIC BLOOD PRESSURE: 139 MMHG | HEART RATE: 72 BPM

## 2024-03-25 PROCEDURE — 99213 OFFICE O/P EST LOW 20 MIN: CPT

## 2024-03-25 NOTE — PHYSICAL EXAM
[No Acute Distress] : no acute distress [Normal Appearance] : normal appearance [Normal Oropharynx] : normal oropharynx [No Neck Mass] : no neck mass [Normal Rate/Rhythm] : normal rate/rhythm [No Murmurs] : no murmurs [Normal S1, S2] : normal s1, s2 [No Resp Distress] : no resp distress [Clear to Auscultation Bilaterally] : clear to auscultation bilaterally [Normal to Percussion] : normal to percussion [Benign] : benign [No Abnormalities] : no abnormalities [No Clubbing] : no clubbing [Normal Gait] : normal gait [No Cyanosis] : no cyanosis [No Edema] : no edema [FROM] : FROM [Normal Color/ Pigmentation] : normal color/ pigmentation [No Focal Deficits] : no focal deficits [Oriented x3] : oriented x3 [Normal Affect] : normal affect

## 2024-03-27 NOTE — DISCUSSION/SUMMARY
[FreeTextEntry1] :  severe obstructive sleep apnea syndrome. AHI 68, RDI 70  Restrictive physiology.  More likely related to body habitus than parenchymal lung or neuromuscular disease.  No evidence of significant airflow limitation.

## 2024-03-27 NOTE — HISTORY OF PRESENT ILLNESS
[Former] : former [Awakes Unrefreshed] : awakes unrefreshed [Fatigue] : fatigue [Recent  Weight Gain] : recent weight gain [Snoring] : snoring [Tired while Driving] : tired while driving [Unintentional Sleep while Inactive] : unintentional sleep while inactive [TextBox_4] : Here for f/u after 2-night [facility] did get a recent Ct chest. Screening, told no change, does not remember where he went never took Medrol dose Himanshu and cough went away on its own  In ER 2 weeks ago for severe headaches and dx. CVA. Told may be old.  Sent to neurologist. No significant upper airway congestion. No GERD.  Had COVID in 11/23 and 1 year prior. Since COVID whenever gets ill gets severe prolonged cough. No wheezing. Some SOB        Past pulmonary history. 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 N Recent travel N Pets N [TextBox_11] : 1-2 cigs/day [TextBox_13] : 30 [YearQuit] : 2024 [Awakes with Headache] : does not awaken with headache

## 2024-03-27 NOTE — CONSULT LETTER
[Consult Letter:] : I had the pleasure of evaluating your patient, [unfilled]. [Dear  ___] : Dear ~ANDRIA, [Consult Closing:] : Thank you very much for allowing me to participate in the care of this patient.  If you have any questions, please do not hesitate to contact me. [Sincerely,] : Sincerely, [DrDeisy  ___] : Dr. KENNY [FreeTextEntry2] : Pepito Singh MD [FreeTextEntry3] : [facility]

## 2024-03-27 NOTE — ASSESSMENT
[FreeTextEntry1] : The pathophysiology as well as medical implications of untreated obstructive sleep apnea syndrome were discussed with this patient. Treatment options including CPAP therapy, Inspire, mandibular advancement device and weight loss were also discussed. to start auto cpap  6-18 cm with a ResMed F 30 med full face mask  r/t end of June for compliance For F/u CT. ordered by primary care doctor. pt will call PCP and get report faxed.

## 2024-06-24 ENCOUNTER — APPOINTMENT (OUTPATIENT)
Dept: PULMONOLOGY | Facility: CLINIC | Age: 49
End: 2024-06-24
Payer: COMMERCIAL

## 2024-06-24 VITALS — SYSTOLIC BLOOD PRESSURE: 131 MMHG | OXYGEN SATURATION: 95 % | DIASTOLIC BLOOD PRESSURE: 83 MMHG | HEART RATE: 69 BPM

## 2024-06-24 DIAGNOSIS — G47.33 OBSTRUCTIVE SLEEP APNEA (ADULT) (PEDIATRIC): ICD-10-CM

## 2024-06-24 PROCEDURE — 99213 OFFICE O/P EST LOW 20 MIN: CPT

## 2024-06-24 RX ORDER — METHYLPREDNISOLONE 4 MG/1
4 TABLET ORAL
Qty: 1 | Refills: 1 | Status: DISCONTINUED | COMMUNITY
Start: 2024-02-15 | End: 2024-06-24

## 2024-12-02 ENCOUNTER — APPOINTMENT (OUTPATIENT)
Dept: PULMONOLOGY | Facility: CLINIC | Age: 49
End: 2024-12-02
Payer: COMMERCIAL

## 2024-12-02 VITALS
DIASTOLIC BLOOD PRESSURE: 81 MMHG | SYSTOLIC BLOOD PRESSURE: 125 MMHG | HEART RATE: 63 BPM | OXYGEN SATURATION: 99 % | RESPIRATION RATE: 16 BRPM

## 2024-12-02 DIAGNOSIS — J98.4 OTHER DISORDERS OF LUNG: ICD-10-CM

## 2024-12-02 DIAGNOSIS — G47.33 OBSTRUCTIVE SLEEP APNEA (ADULT) (PEDIATRIC): ICD-10-CM

## 2024-12-02 PROCEDURE — 99213 OFFICE O/P EST LOW 20 MIN: CPT

## 2025-02-24 ENCOUNTER — APPOINTMENT (OUTPATIENT)
Dept: PULMONOLOGY | Facility: CLINIC | Age: 50
End: 2025-02-24
Payer: COMMERCIAL

## 2025-02-24 VITALS — SYSTOLIC BLOOD PRESSURE: 126 MMHG | DIASTOLIC BLOOD PRESSURE: 70 MMHG

## 2025-02-24 DIAGNOSIS — Q67.6 PECTUS EXCAVATUM: ICD-10-CM

## 2025-02-24 DIAGNOSIS — J98.4 OTHER DISORDERS OF LUNG: ICD-10-CM

## 2025-02-24 DIAGNOSIS — G47.33 OBSTRUCTIVE SLEEP APNEA (ADULT) (PEDIATRIC): ICD-10-CM

## 2025-02-24 PROCEDURE — 99214 OFFICE O/P EST MOD 30 MIN: CPT | Mod: 25

## 2025-02-24 PROCEDURE — 94010 BREATHING CAPACITY TEST: CPT

## 2025-03-13 ENCOUNTER — NON-APPOINTMENT (OUTPATIENT)
Age: 50
End: 2025-03-13

## 2025-05-12 ENCOUNTER — APPOINTMENT (OUTPATIENT)
Dept: PULMONOLOGY | Facility: CLINIC | Age: 50
End: 2025-05-12
Payer: COMMERCIAL

## 2025-05-12 DIAGNOSIS — G47.33 OBSTRUCTIVE SLEEP APNEA (ADULT) (PEDIATRIC): ICD-10-CM

## 2025-05-12 PROCEDURE — 99213 OFFICE O/P EST LOW 20 MIN: CPT | Mod: 95
